# Patient Record
Sex: MALE | Race: WHITE | Employment: OTHER | URBAN - METROPOLITAN AREA
[De-identification: names, ages, dates, MRNs, and addresses within clinical notes are randomized per-mention and may not be internally consistent; named-entity substitution may affect disease eponyms.]

---

## 2017-05-01 ENCOUNTER — OFFICE VISIT (OUTPATIENT)
Dept: INTERNAL MEDICINE CLINIC | Age: 77
End: 2017-05-01

## 2017-05-01 VITALS
HEIGHT: 73 IN | HEART RATE: 75 BPM | BODY MASS INDEX: 34.3 KG/M2 | WEIGHT: 258.8 LBS | SYSTOLIC BLOOD PRESSURE: 157 MMHG | DIASTOLIC BLOOD PRESSURE: 75 MMHG | OXYGEN SATURATION: 97 % | TEMPERATURE: 98 F

## 2017-05-01 DIAGNOSIS — Z13.31 SCREENING FOR DEPRESSION: ICD-10-CM

## 2017-05-01 DIAGNOSIS — Z00.00 ROUTINE GENERAL MEDICAL EXAMINATION AT A HEALTH CARE FACILITY: ICD-10-CM

## 2017-05-01 RX ORDER — IBUPROFEN 200 MG
400 TABLET ORAL AS NEEDED
COMMUNITY
End: 2022-09-25 | Stop reason: ALTCHOICE

## 2017-05-01 NOTE — PROGRESS NOTES
Dr. Puja Dsouza referred Avinash Hardin, 1940, a 68 y.o. male for a Medicare Annual Wellness Visit (AWV). This is a Subsequent Medicare Annual Wellness Visit providing Personalized Prevention Plan Services (PPPS) (Performed 12 months after initial AWV and PPPS )    I have reviewed the patient's medical history in detail and updated the computerized patient record. History     Past Medical History:   Diagnosis Date    Cancer [facility]) 2004    prostate    Hypertension       Past Surgical History:   Procedure Laterality Date    HX CATARACT REMOVAL      bilateral    HX COLONOSCOPY  10/29/2012    Repeat in 10 years    HX CYST REMOVAL      Hands and Pilonidal cyst removal (8 from spine)    HX PROSTATECTOMY  2003     Current Outpatient Prescriptions   Medication Sig Dispense Refill    ibuprofen (MOTRIN) 200 mg tablet Take 400 mg by mouth as needed for Pain.       losartan (COZAAR) 100 mg tablet TAKE 1 TABLET BY MOUTH EVERY DAY 90 Tab 3     No Known Allergies  Family History   Problem Relation Age of Onset    High Cholesterol Mother     Stroke Father     Alcohol abuse Father     Other Father      tobacco abuse    No Known Problems Sister     Cancer Brother      Skin CA    Other Son      Herpes in the eyes (while in [employer])    Cancer Daughter 29     Ovarian CA     Social History   Substance Use Topics    Smoking status: Never Smoker    Smokeless tobacco: Never Used      Comment: 3 months at 12years old/cigar use (occasionally 22 yrs old)    Alcohol use 2.4 oz/week     4 Glasses of wine per week     Patient Active Problem List   Diagnosis Code    Essential hypertension, benign I10    Obesity E66.9    Prostate cancer ([facility] [address] C01    Umbilical hernia R42.6    Prediabetes R73.03    Advanced care planning/counseling discussion Z71.89       Depression Risk Factor Screening:     PHQ 2 / 9, over the last two weeks 5/1/2017   Little interest or pleasure in doing things Not at all   Feeling down, depressed or hopeless Not at all   Total Score PHQ 2 0     Alcohol Risk Factor Screening: On any occasion during the past 3 months, have you had more than 4 drinks containing alcohol? No    Do you average more than 14 drinks per week? No      Functional Ability and Level of Safety:     Hearing Loss   States that there are some issues but not interested in evaluation at this time. Activities of Daily Living   Self-care. Requires assistance with: see table  ADL Assessment 5/1/2017   Feeding yourself No Help Needed   Getting from bed to chair No Help Needed   Getting dressed No Help Needed   Bathing or showering No Help Needed   Walk across the room (includes cane/walker) No Help Needed   Using the telphone No Help Needed   Taking your medications No Help Needed   Preparing meals No Help Needed   Managing money (expenses/bills) No Help Needed   Moderately strenuous housework (laundry) No Help Needed   Shopping for personal items (toiletries/medicines) No Help Needed   Shopping for groceries No Help Needed   Driving No Help Needed   Climbing a flight of stairs No Help Needed   Getting to places beyond walking distances No Help Needed       Fall Risk     Fall Risk Assessment, last 12 mths 5/1/2017   Able to walk? Yes   Fall in past 12 months? No     Abuse Screen   Patient is not abused  Abuse Screening Questionnaire 5/1/2017   Do you ever feel afraid of your partner? N   Are you in a relationship with someone who physically or mentally threatens you? N   Is it safe for you to go home? Y     Review of Systems   Not required    Physical Examination     Evaluation of Cognitive Function:  Mood/affect:  happy  Appearance: age appropriate, casually dressed and overweight  Family member/caregiver input: States that he has noticed changes in his memory. Noticing changes in how he can manipulate numbers, use to be able to mentally calculate. Continue to monitor patient for his memory. No issues noted during visit.     No exam performed today, Medicare Annual Wellness Visit. Patient Care Team:  Kat Christiansen MD as PCP - Jaqueline Momin RN as Nurse Aamir Jones MD (General Surgery)  Chrissy Keating MD (Ophthalmology)    Advice/Referrals/Counseling   Education and counseling provided:  Pneumococcal Vaccine  Influenza Vaccine  Shingles Vaccine      Assessment/Plan       ICD-10-CM ICD-9-CM    1. Routine general medical examination at a health care facility Z00.00 V70.0 [address]   2. Screening for depression Z13.89 V79.0 DEPRESSION SCREEN ANNUAL   3. Reviewed medications and side effects in detail. A.  Med Rec completed during today's visit. Medications Discontinued During This Encounter   Medication Reason    losartan (COZAAR) 461 mg tablet Duplicate Order    tadalafil (CIALIS) 5 mg tablet Not A Current Medication    HYDROcodone-acetaminophen (NORCO) 5-325 mg per tablet Therapy Completed     4. Immunizations:   A.  PCV13:  Not currently interested. B. Influenza:  Not currently interested. C.  Shingles:  Not currently interested. 5.  Preventive Screenings:  Currently up to date. 6.  Labs:     A.  A1C:  Consider checking at next PCP appointment. 7.  Follow up:  Due for PCP follow up. Patient to schedule prior to leaving today. 8.  Exercise:  [place], does gardening and will split wood. He is very active throughout the week. 9.  Advanced Directives:  States that he has his  completing the information. Requested for him to bring a copy to next visit, once completed. Patient verbalized understanding of information presented. Answered all of the patient's questions. AVS handed and reviewed with patient.     Rohith Christina, ROSANGELAD, BCACP

## 2017-05-01 NOTE — PATIENT INSTRUCTIONS
Medicare Part B Preventive Services Limitations Recommendation Scheduled   Bone Mass Measurement  (age 72 & older, biennial) Requires diagnosis related to osteoporosis or estrogen deficiency. Biennial benefit unless patient has history of long-term glucocorticoid tx or baseline is needed because initial test was by other method N/A N/A   Cardiovascular Screening Blood Tests (every 5 years)  Total cholesterol, HDL, Triglycerides Order as a panel if possible Last:  10/28/16    Every Year Next: 10/2017   Colorectal Cancer Screening  -Fecal occult blood test (annual)  -Flexible sigmoidoscopy (5y)  -Screening colonoscopy (10y)  -Barium Enema  Last: 10/29/12    Repeat in 10 years Next: 10/2022   Counseling to Prevent Tobacco Use (up to 8 sessions per year)  - Counseling greater than 3 and up to 10 minutes  - Counseling greater than 10 minutes Patients must be asymptomatic of tobacco-related conditions to receive as preventive service N/A N/A   Diabetes Screening Tests (at least every 3 years, Medicare covers annually or at 6-month intervals for prediabetic patients)    Fasting blood sugar (FBS) or glucose tolerance test (GTT) Patient must be diagnosed with one of the following:  -Hypertension, Dyslipidemia, obesity, previous impaired FBS or GTT  Or any two of the following: overweight, FH of diabetes, age ? 72, history of gestational diabetes, birth of baby weighing more than 9 pounds Last: 10/28/16, last A1C 5.8%    Every 6-12 months depending on your result   Next:  4/2017-10/2017   Diabetes Self-Management Training (DSMT) (no USPSTF recommendation) Requires referral by treating physician for patient with diabetes or renal disease. 10 hours of initial DSMT session of no less than 30 minutes each in a continuous 12-month period. 2 hours of follow-up DSMT in subsequent years.  N/A N/A   Glaucoma Screening (no USPSTF recommendation) Diabetes mellitus, family history, , age 48 or over,  American, age 72 or over Last: 12/2016    Every year Next:  12/2017   Human Immunodeficiency Virus (HIV) Screening (annually for increased risk patients)  HIV-1 and HIV-2 by EIA, ANDREW, rapid antibody test, or oral mucosa transudate Patient must be at increased risk for HIV infection per USPSTF guidelines or pregnant. Tests covered annually for patients at increased risk. Pregnant patients may receive up to 3 test during pregnancy. N/A N/A   Medical Nutrition Therapy (MNT) (for diabetes or renal disease not recommended schedule) Requires referral by treating physician for patient with diabetes or renal disease. Can be provided in same year as diabetes self-management training (DSMT), and CMS recommends medical nutrition therapy take place after DSMT. Up to 3 hours for initial year and 2 hours in subsequent years. N/A N/A   Prostate Cancer Screening (annually up to age 76)  - Digital rectal exam (ODETTE)  - Prostate specific antigen (PSA) Annually (age 48 or over), ODETTE not paid separately when covered E/M service is provided on same date Last:  Checked by Dr. Puja Dsouza Next:  Discuss with your doctor if this test is appropriate for you   Seasonal Influenza Vaccination (annually)  Last:     Every Fall Please get one this Fall   Shingles Vaccination A shingles vaccine is also recommended once in a lifetime after age 61  Last Next:  Consider getting at your local pharmacy   Pneumococcal Vaccination (once after 72)  Last: Pneumovax: 10/30/2013 Next: Jose Mckeon 13 due, consider getting at your local pharmacy   Hepatitis B Vaccinations (if medium/high risk) Medium/high risk factors:  End-stage renal disease,  Hemophiliacs who received Factor VIII or IX concentrates, Clients of institutions for the mentally retarded, Persons who live in the same house as a HepB virus carrier, Homosexual men, Illicit injectable drug abusers.  N/A N/A   Ultrasound Screening for Abdominal Aortic Aneurysm (AAA) (once) Patient must be referred through [facility] and not have had a screening for abdominal aortic aneurysm before under Medicare.   Limited to patients who meet one of the following criteria:  - Men who are 73-68 years old and have smoked more than 100 cigarettes in their lifetime.  -Anyone with a FH of AAA  -Anyone recommended for screening by USPSTF N/A N/A   N/A:  Not applicable

## 2017-05-01 NOTE — MR AVS SNAPSHOT
Visit Information Date & Time Provider Department Dept. Phone Encounter #  
 5/1/2017  2:00 PM Prasanth Saldaña, [address] [phone] [id number] Follow-up Instructions Return Needs PCP F/u, for F/u with Dr. Puja Dsouza. Upcoming Health Maintenance Date Due ZOSTER VACCINE AGE 60> 10/16/2000 GLAUCOMA SCREENING Q2Y 10/16/2005 Pneumococcal 65+ High/Highest Risk (2 of 2 - PCV13) 10/30/2014 MEDICARE YEARLY EXAM 11/4/2016 INFLUENZA AGE 9 TO ADULT 8/1/2017 COLONOSCOPY 10/29/2022 DTaP/Tdap/Td series (2 - Td) 10/30/2023 Allergies as of 5/1/2017  Review Complete On: 5/1/2017 By: Prasanth Saldaña PHARMD  
 No Known Allergies Current Immunizations  Reviewed on 5/1/2017 Name Date Pneumococcal Polysaccharide (PPSV-23) 10/30/2013 Tdap 10/30/2013 Reviewed by Prasanth Saldaña PHARMD on 5/1/2017 at  2:42 PM  
You Were Diagnosed With   
  
 Codes Comments Routine general medical examination at a health care facility     ICD-10-CM: Z00.00 ICD-9-CM: V70.0 Screening for depression     ICD-10-CM: Z13.89 ICD-9-CM: V79.0 Vitals BP Pulse Temp Height(growth percentile) Weight(growth percentile) SpO2  
 157/75 (BP 1 Location: Right arm, BP Patient Position: Sitting) 75 98 °F (36.7 °C) (Oral) 6' 1\" (1.854 m) 258 lb 12.8 oz (117.4 kg) 97% BMI Smoking Status 34.14 kg/m2 Never Smoker Vitals History BMI and BSA Data Body Mass Index Body Surface Area  
 34.14 kg/m 2 2.46 m 2 Preferred Pharmacy Pharmacy Name Phone [id number] [place] [address] [phone] Your Updated Medication List  
  
   
This list is accurate as of: 5/1/17  2:51 PM.  Always use your most recent med list.  
  
  
  
  
 ibuprofen 200 mg tablet Commonly known as:  MOTRIN Take 400 mg by mouth as needed for Pain.  
  
 losartan 100 mg tablet Commonly known as:  COZAAR  
TAKE 1 TABLET BY MOUTH EVERY DAY We Performed the Following [address] [[id number] [facility]] Follow-up Instructions Return Needs PCP F/u, for F/u with Dr. Swetha Bauman. Patient Instructions Medicare Part B Preventive Services Limitations Recommendation Scheduled Bone Mass Measurement 
(age 72 & older, biennial) Requires diagnosis related to osteoporosis or estrogen deficiency. Biennial benefit unless patient has history of long-term glucocorticoid tx or baseline is needed because initial test was by other method N/A N/A Cardiovascular Screening Blood Tests (every 5 years) Total cholesterol, HDL, Triglycerides Order as a panel if possible Last:  10/28/16 Every Year Next: 10/2017 Colorectal Cancer Screening 
-Fecal occult blood test (annual) -Flexible sigmoidoscopy (5y) 
-Screening colonoscopy (10y) -Barium Enema  Last: 10/29/12 Repeat in 10 years Next: 10/2022 Counseling to Prevent Tobacco Use (up to 8 sessions per year) - Counseling greater than 3 and up to 10 minutes - Counseling greater than 10 minutes Patients must be asymptomatic of tobacco-related conditions to receive as preventive service N/A N/A Diabetes Screening Tests (at least every 3 years, Medicare covers annually or at 6-month intervals for prediabetic patients) Fasting blood sugar (FBS) or glucose tolerance test (GTT) Patient must be diagnosed with one of the following: 
-Hypertension, Dyslipidemia, obesity, previous impaired FBS or GTT 
Or any two of the following: overweight, FH of diabetes, age ? 72, history of gestational diabetes, birth of baby weighing more than 9 pounds Last: 10/28/16, last A1C 5.8% Every 6-12 months depending on your result Next:  4/2017-10/2017 Diabetes Self-Management Training (DSMT) (no USPSTF recommendation) Requires referral by treating physician for patient with diabetes or renal disease. 10 hours of initial DSMT session of no less than 30 minutes each in a continuous 12-month period. 2 hours of follow-up DSMT in subsequent years. N/A N/A Glaucoma Screening (no USPSTF recommendation) Diabetes mellitus, family history, , age 48 or over,  American, age 72 or over Last: 12/2016 Every year Next:  12/2017 Human Immunodeficiency Virus (HIV) Screening (annually for increased risk patients) HIV-1 and HIV-2 by EIA, ANDREW, rapid antibody test, or oral mucosa transudate Patient must be at increased risk for HIV infection per USPSTF guidelines or pregnant. Tests covered annually for patients at increased risk. Pregnant patients may receive up to 3 test during pregnancy. N/A N/A Medical Nutrition Therapy (MNT) (for diabetes or renal disease not recommended schedule) Requires referral by treating physician for patient with diabetes or renal disease. Can be provided in same year as diabetes self-management training (DSMT), and CMS recommends medical nutrition therapy take place after DSMT. Up to 3 hours for initial year and 2 hours in subsequent years. N/A N/A Prostate Cancer Screening (annually up to age 76) - Digital rectal exam (ODETTE) - Prostate specific antigen (PSA) Annually (age 48 or over), ODETTE not paid separately when covered E/M service is provided on same date Last:  Checked by Dr. Anahi Hinson Next:  Discuss with your doctor if this test is appropriate for you Seasonal Influenza Vaccination (annually)  Last:  
 
Every Fall Please get one this Fall Shingles Vaccination A shingles vaccine is also recommended once in a lifetime after age 62  Last Next:  Consider getting at your local pharmacy Pneumococcal Vaccination (once after 65)  Last: Pneumovax: 10/30/2013 Next: prevnar 13 due, consider getting at your local pharmacy Hepatitis B Vaccinations (if medium/high risk) Medium/high risk factors:  End-stage renal disease, 
 Hemophiliacs who received Factor VIII or IX concentrates, Clients of institutions for the mentally retarded, Persons who live in the same house as a HepB virus carrier, Homosexual men, Illicit injectable drug abusers. N/A N/A Ultrasound Screening for Abdominal Aortic Aneurysm (AAA) (once) Patient must be referred through [facility] and not have had a screening for abdominal aortic aneurysm before under Medicare. Limited to patients who meet one of the following criteria: 
- Men who are 73-68 years old and have smoked more than 100 cigarettes in their lifetime. 
-Anyone with a FH of AAA 
-Anyone recommended for screening by USPSTF N/A N/A  
N/A:  Not applicable Introducing [facility]! Laure Davis introduces [facility] patient portal. Now you can access parts of your medical record, email your doctor's office, and request medication refills online. 1. In your internet browser, go to [email or web address] 2. Click on the First Time User? Click Here link in the Sign In box. You will see the New Member Sign Up page. 3. Enter your [facility] Access Code exactly as it appears below. You will not need to use this code after youve completed the sign-up process. If you do not sign up before the expiration date, you must request a new code. · [facility] Access Code: [email or web address] Expires: 7/30/2017  2:51 PM 
 
4. Enter the last four digits of your Social Security Number (xxxx) and Date of Birth (mm/dd/yyyy) as indicated and click Submit. You will be taken to the next sign-up page. 5. Create a tsumobit ID. This will be your [facility] login ID and cannot be changed, so think of one that is secure and easy to remember. 6. Create a [facility] password. You can change your password at any time. 7. Enter your Password Reset Question and Answer. This can be used at a later time if you forget your password. 8. Enter your e-mail address.  You will receive e-mail notification when new information is available in Ketsu. 9. Click Sign Up. You can now view and download portions of your medical record. 10. Click the Download Summary menu link to download a portable copy of your medical information. If you have questions, please visit the Frequently Asked Questions section of the K[employer]u website. Remember, [employer] is NOT to be used for urgent needs. For medical emergencies, dial 911. Now available from your iPhone and Android! Please provide this summary of care documentation to your next provider. Your primary care clinician is listed as Shayla SANCHEZ. If you have any questions after today's visit, please call [phone].

## 2017-05-18 ENCOUNTER — OFFICE VISIT (OUTPATIENT)
Dept: INTERNAL MEDICINE CLINIC | Age: 77
End: 2017-05-18

## 2017-05-18 VITALS
TEMPERATURE: 98 F | HEART RATE: 76 BPM | BODY MASS INDEX: 33.43 KG/M2 | OXYGEN SATURATION: 98 % | RESPIRATION RATE: 16 BRPM | SYSTOLIC BLOOD PRESSURE: 126 MMHG | HEIGHT: 73 IN | DIASTOLIC BLOOD PRESSURE: 72 MMHG | WEIGHT: 252.2 LBS

## 2017-05-18 DIAGNOSIS — R10.10 UPPER ABDOMINAL PAIN: ICD-10-CM

## 2017-05-18 DIAGNOSIS — K42.9 UMBILICAL HERNIA WITHOUT MENTION OF OBSTRUCTION OR GANGRENE: Primary | ICD-10-CM

## 2017-05-18 DIAGNOSIS — M62.08 RECTUS DIASTASIS: ICD-10-CM

## 2017-05-18 DIAGNOSIS — K42.9 UMBILICAL HERNIA WITHOUT OBSTRUCTION AND WITHOUT GANGRENE: ICD-10-CM

## 2017-05-18 DIAGNOSIS — R10.10 UPPER ABDOMINAL PAIN: Primary | ICD-10-CM

## 2017-05-18 NOTE — MR AVS SNAPSHOT
Visit Information Date & Time Provider Department Dept. Phone Encounter #  
 5/18/2017  3:15 PM Memojudith Peralta, [address] [place] [address],[address] [phone] [id number] Follow-up Instructions Return in about 2 weeks (around 6/1/2017) for abdominal pain. Upcoming Health Maintenance Date Due ZOSTER VACCINE AGE 60> 10/16/2000 GLAUCOMA SCREENING Q2Y 10/16/2005 Pneumococcal 65+ High/Highest Risk (2 of 2 - PCV13) 10/30/2014 INFLUENZA AGE 9 TO ADULT 8/1/2017 MEDICARE YEARLY EXAM 5/2/2018 COLONOSCOPY 10/29/2022 DTaP/Tdap/Td series (2 - Td) 10/30/2023 Allergies as of 5/18/2017  Review Complete On: 5/18/2017 By: Yury Charles No Known Allergies Current Immunizations  Reviewed on 5/1/2017 Name Date Pneumococcal Polysaccharide (PPSV-23) 10/30/2013 Tdap 10/30/2013 Not reviewed this visit You Were Diagnosed With   
  
 Codes Comments Upper abdominal pain    -  Primary ICD-10-CM: R10.10 ICD-9-CM: 789.09 Umbilical hernia without obstruction and without gangrene     ICD-10-CM: K42.9 ICD-9-CM: 553.1 Rectus diastasis     ICD-10-CM: M62.08 
ICD-9-CM: 728.84 Vitals BP Pulse Temp Resp Height(growth percentile) Weight(growth percentile) 126/72 (BP 1 Location: Left arm, BP Patient Position: Sitting) 76 98 °F (36.7 °C) (Oral) 16 6' 1\" (1.854 m) 252 lb 3.2 oz (114.4 kg) SpO2 BMI Smoking Status 98% 33.27 kg/m2 Never Smoker Vitals History BMI and BSA Data Body Mass Index Body Surface Area  
 33.27 kg/m 2 2.43 m 2 Preferred Pharmacy Pharmacy Name Phone [id number] [place] , [address] [address] [phone] Your Updated Medication List  
  
   
This list is accurate as of: 5/18/17  3:55 PM.  Always use your most recent med list.  
  
  
  
  
 ibuprofen 200 mg tablet Commonly known as:  MOTRIN Take 400 mg by mouth as needed for Pain. losartan 100 mg tablet Commonly known as:  COZAAR  
TAKE 1 TABLET BY MOUTH EVERY DAY We Performed the Following CBC W/O DIFF [38474 CPT(R)] METABOLIC PANEL, COMPREHENSIVE [01509 CPT(R)] URINALYSIS W/ RFLX MICROSCOPIC [91394 CPT(R)] Follow-up Instructions Return in about 2 weeks (around 6/1/2017) for abdominal pain. To-Do List   
 05/18/2017 Imaging:  XR ABD FLAT/ ERECT   
  
 05/19/2017 Imaging:  CT ABD W CONT Introducing [facility]! Miguel Acevedo introduces [facility] patient portal. Now you can access parts of your medical record, email your doctor's office, and request medication refills online. 1. In your internet browser, go to [email or web address] 2. Click on the First Time User? Click Here link in the Sign In box. You will see the New Member Sign Up page. 3. Enter your [facility] Access Code exactly as it appears below. You will not need to use this code after youve completed the sign-up process. If you do not sign up before the expiration date, you must request a new code. · [facility] Access Code: [email or web address] Expires: 7/30/2017  2:51 PM 
 
4. Enter the last four digits of your Social Security Number (xxxx) and Date of Birth (mm/dd/yyyy) as indicated and click Submit. You will be taken to the next sign-up page. 5. Create a [facility] ID. This will be your [facility] login ID and cannot be changed, so think of one that is secure and easy to remember. 6. Create a [facility] password. You can change your password at any time. 7. Enter your Password Reset Question and Answer. This can be used at a later time if you forget your password. 8. Enter your e-mail address. You will receive e-mail notification when new information is available in [address] 9. Click Sign Up. You can now view and download portions of your medical record. 10. Click the Download Summary menu link to download a portable copy of your medical information. If you have questions, please visit the Frequently Asked Questions section of the [employer] website. Remember, [employer] is NOT to be used for urgent needs. For medical emergencies, dial 911. Now available from your iPhone and Android! Please provide this summary of care documentation to your next provider. Your primary care clinician is listed as Haritha SANCHEZ. If you have any questions after today's visit, please call [phone].

## 2017-05-18 NOTE — PROGRESS NOTES
HISTORY OF PRESENT ILLNESS  Davis Rowe is a 68 y.o. male. HPI   Pt c/o feeling bad with pain in upper abdomen x 1 week  Sometimes radiation to back lumbar area  Pain is dull, worse when sitting and when trying to lay supine at night  No n/v or post meal pain  Normal bm's  Urine has been dark. Stools normal appearing  Weight unchanged  He feels that his rectus diastasis has become more prominent and painful      Patient Active Problem List    Diagnosis Date Noted    Advanced care planning/counseling discussion 11/04/2015    Prediabetes 10/30/2013    Essential hypertension, benign 04/29/2010    Obesity 04/29/2010    [facility] 48/73/5511    Umbilical hernia 70/99/9677     Current Outpatient Prescriptions   Medication Sig Dispense Refill    ibuprofen (MOTRIN) 200 mg tablet Take 400 mg by mouth as needed for Pain.  losartan (COZAAR) 100 mg tablet TAKE 1 TABLET BY MOUTH EVERY DAY 90 Tab 3     No Known Allergies   Lab Results  Component Value Date/Time   WBC 5.4 10/28/2016 08:54 AM   WBC 6.0 05/10/2012 07:53 AM   HGB 15.0 10/28/2016 08:54 AM   HCT 44.4 10/28/2016 08:54 AM   PLATELET 373 89/57/4692 08:54 AM   MCV 89 10/28/2016 08:54 AM       Lab Results  Component Value Date/Time   GFR est AA 94 10/28/2016 08:54 AM   GFR est non-AA 82 10/28/2016 08:54 AM   Creatinine 0.91 10/28/2016 08:54 AM   BUN 14 10/28/2016 08:54 AM   Sodium 143 10/28/2016 08:54 AM   Potassium 4.9 10/28/2016 08:54 AM   Chloride 103 10/28/2016 08:54 AM   CO2 23 10/28/2016 08:54 AM         ROS    Physical Exam   Constitutional: He appears well-developed and well-nourished. No distress. Appears stated age   HENT:   Head: Normocephalic. Cardiovascular: Normal rate, regular rhythm and normal heart sounds. Pulmonary/Chest: Effort normal and breath sounds normal.   Abdominal: Soft. Mild TTP upper abdomen, visible rectus diastasis with valsalva. Umbilical hernia is soft and not TTP   Musculoskeletal: He exhibits no edema. Neurological: He is alert. Psychiatric: He has a normal mood and affect. Nursing note and vitals reviewed. ASSESSMENT and PLAN  Patrick Montelongo was seen today for hernia (non specific) and possible hernia. Diagnoses and all orders for this visit:    Upper abdominal pain  -     CT ABD W CONT; Future  -     CBC W/O DIFF  -     METABOLIC PANEL, COMPREHENSIVE  -     URINALYSIS W/ RFLX MICROSCOPIC  -     XR ABD FLAT/ ERECT; Future    Umbilical hernia without obstruction and without gangrene  -     XR ABD FLAT/ ERECT; Future    Rectus diastasis      Follow-up Disposition:  Return in about 2 weeks (around 6/1/2017) for abdominal pain.

## 2017-05-19 ENCOUNTER — HOSPITAL ENCOUNTER (OUTPATIENT)
Dept: CT IMAGING | Age: 77
Discharge: HOME | End: 2017-05-19
Attending: INTERNAL MEDICINE
Payer: MEDICARE

## 2017-05-19 DIAGNOSIS — R10.10 UPPER ABDOMINAL PAIN: ICD-10-CM

## 2017-05-19 LAB
ALBUMIN SERPL-MCNC: 4.2 G/DL (ref 3.5–4.8)
ALBUMIN/GLOB SERPL: 1.5 {RATIO} (ref 1.2–2.2)
ALP SERPL-CCNC: 65 IU/L (ref 39–117)
ALT SERPL-CCNC: 13 IU/L (ref 0–44)
APPEARANCE UR: CLEAR
AST SERPL-CCNC: 17 IU/L (ref 0–40)
BILIRUB SERPL-MCNC: 0.5 MG/DL (ref 0–1.2)
BILIRUB UR QL STRIP: NEGATIVE
BUN SERPL-MCNC: 19 MG/DL (ref 8–27)
BUN/CREAT SERPL: 19 (ref 10–24)
CALCIUM SERPL-MCNC: 8.9 MG/DL (ref 8.6–10.2)
CHLORIDE SERPL-SCNC: 101 MMOL/L (ref 96–106)
CO2 SERPL-SCNC: 25 MMOL/L (ref 18–29)
COLOR UR: YELLOW
CREAT SERPL-MCNC: 1 MG/DL (ref 0.76–1.27)
ERYTHROCYTE [DISTWIDTH] IN BLOOD BY AUTOMATED COUNT: 14.7 % (ref 12.3–15.4)
GLOBULIN SER CALC-MCNC: 2.8 G/DL (ref 1.5–4.5)
GLUCOSE SERPL-MCNC: 89 MG/DL (ref 65–99)
GLUCOSE UR QL: NEGATIVE
HCT VFR BLD AUTO: 41.6 % (ref 37.5–51)
HGB BLD-MCNC: 14.5 G/DL (ref 12.6–17.7)
HGB UR QL STRIP: NEGATIVE
KETONES UR QL STRIP: NEGATIVE
LEUKOCYTE ESTERASE UR QL STRIP: NEGATIVE
MCH RBC QN AUTO: 30.2 PG (ref 26.6–33)
MCHC RBC AUTO-ENTMCNC: 34.9 G/DL (ref 31.5–35.7)
MCV RBC AUTO: 87 FL (ref 79–97)
MICRO URNS: ABNORMAL
NITRITE UR QL STRIP: NEGATIVE
PH UR STRIP: 6 [PH] (ref 5–7.5)
PLATELET # BLD AUTO: 201 X10E3/UL (ref 150–379)
POTASSIUM SERPL-SCNC: 4.7 MMOL/L (ref 3.5–5.2)
PROT SERPL-MCNC: 7 G/DL (ref 6–8.5)
PROT UR QL STRIP: NEGATIVE
RBC # BLD AUTO: 4.8 X10E6/UL (ref 4.14–5.8)
SODIUM SERPL-SCNC: 140 MMOL/L (ref 134–144)
SP GR UR: 1.03 (ref 1–1.03)
UROBILINOGEN UR STRIP-MCNC: 2 MG/DL (ref 0.2–1)
WBC # BLD AUTO: 7.4 X10E3/UL (ref 3.4–10.8)

## 2017-05-19 PROCEDURE — 74011000255 HC RX REV CODE- 255: Performed by: INTERNAL MEDICINE

## 2017-05-19 PROCEDURE — 74011636320 HC RX REV CODE- 636/320: Performed by: INTERNAL MEDICINE

## 2017-05-19 PROCEDURE — 74160 CT ABDOMEN W/CONTRAST: CPT

## 2017-05-19 PROCEDURE — 74011250636 HC RX REV CODE- 250/636: Performed by: INTERNAL MEDICINE

## 2017-05-19 RX ORDER — SODIUM CHLORIDE 0.9 % (FLUSH) 0.9 %
10 SYRINGE (ML) INJECTION
Status: COMPLETED | OUTPATIENT
Start: 2017-05-19 | End: 2017-05-19

## 2017-05-19 RX ORDER — SODIUM CHLORIDE 9 MG/ML
50 INJECTION, SOLUTION INTRAVENOUS
Status: COMPLETED | OUTPATIENT
Start: 2017-05-19 | End: 2017-05-19

## 2017-05-19 RX ORDER — BARIUM SULFATE 20 MG/ML
900 SUSPENSION ORAL
Status: COMPLETED | OUTPATIENT
Start: 2017-05-19 | End: 2017-05-19

## 2017-05-19 RX ADMIN — IOPAMIDOL 100 ML: 755 INJECTION, SOLUTION INTRAVENOUS at 10:53

## 2017-05-19 RX ADMIN — SODIUM CHLORIDE 50 ML/HR: 900 INJECTION, SOLUTION INTRAVENOUS at 10:53

## 2017-05-19 RX ADMIN — Medication 10 ML: at 10:53

## 2017-05-19 RX ADMIN — BARIUM SULFATE 900 ML: 21 SUSPENSION ORAL at 10:53

## 2017-05-19 NOTE — PROGRESS NOTES
Discussed results of labs , xr, CT -see general surgeon -Dr Cindy Stein for the hernia and upper abd pain.

## 2017-05-22 ENCOUNTER — OFFICE VISIT (OUTPATIENT)
Dept: SURGERY | Age: 77
End: 2017-05-22

## 2017-05-22 VITALS
OXYGEN SATURATION: 96 % | SYSTOLIC BLOOD PRESSURE: 158 MMHG | HEIGHT: 73 IN | DIASTOLIC BLOOD PRESSURE: 82 MMHG | HEART RATE: 64 BPM | WEIGHT: 252.6 LBS | BODY MASS INDEX: 33.48 KG/M2

## 2017-05-22 DIAGNOSIS — Z01.818 PREOP TESTING: ICD-10-CM

## 2017-05-22 DIAGNOSIS — K42.9 UMBILICAL HERNIA WITHOUT OBSTRUCTION AND WITHOUT GANGRENE: Primary | ICD-10-CM

## 2017-05-22 NOTE — MR AVS SNAPSHOT
Visit Information Date & Time Provider Department Dept. Phone Encounter #  
 5/22/2017  2:20 PM Stacia Hamilton MD [facility] [place] [id number] Upcoming Health Maintenance Date Due ZOSTER VACCINE AGE 60> 10/16/2000 GLAUCOMA SCREENING Q2Y 10/16/2005 Pneumococcal 65+ High/Highest Risk (2 of 2 - PCV13) 10/30/2014 INFLUENZA AGE 9 TO ADULT 8/1/2017 MEDICARE YEARLY EXAM 5/2/2018 COLONOSCOPY 10/29/2022 DTaP/Tdap/Td series (2 - Td) 10/30/2023 Allergies as of 5/22/2017  Review Complete On: 5/22/2017 By: Stacia Hamilton MD  
 No Known Allergies Current Immunizations  Reviewed on 5/1/2017 Name Date Pneumococcal Polysaccharide (PPSV-23) 10/30/2013 Tdap 10/30/2013 Not reviewed this visit You Were Diagnosed With   
  
 Codes Comments Umbilical hernia without obstruction and without gangrene    -  Primary ICD-10-CM: K42.9 ICD-9-CM: 553.1 Preop testing     ICD-10-CM: B67.368 ICD-9-CM: V72.84 Vitals BP Pulse Height(growth percentile) Weight(growth percentile) SpO2 BMI  
 158/82 (BP 1 Location: Right arm, BP Patient Position: Sitting) 64 6' 1\" (1.854 m) 252 lb 9.6 oz (114.6 kg) 96% 33.33 kg/m2 Smoking Status Never Smoker BMI and BSA Data Body Mass Index Body Surface Area  
 33.33 kg/m 2 2.43 m 2 Preferred Pharmacy Pharmacy Name Phone [id number] [place] [address] [phone] Your Updated Medication List  
  
   
This list is accurate as of: 5/22/17  5:00 PM.  Always use your most recent med list.  
  
  
  
  
 ibuprofen 200 mg tablet Commonly known as:  MOTRIN Take 400 mg by mouth as needed for Pain.  
  
 losartan 100 mg tablet Commonly known as:  COZAAR  
TAKE 1 TABLET BY MOUTH EVERY DAY To-Do List   
 05/22/2017 ECG:  EKG, 12 LEAD, INITIAL Introducing [facility]! Annmarie Clark introduces [employer]am[employer] patient portal. Now you can access parts of your medical record, email your doctor's office, and request medication refills online. 1. In your internet browser, go to [email or web address] 2. Click on the First Time User? Click Here link in the Sign In box. You will see the New Member Sign Up page. 3. Enter your Famely Access Code exactly as it appears below. You will not need to use this code after youve completed the sign-up process. If you do not sign up before the expiration date, you must request a new code. · Famely Access Code: [email or web address] Expires: 7/30/2017  2:51 PM 
 
4. Enter the last four digits of your Social Security Number (xxxx) and Date of Birth (mm/dd/yyyy) as indicated and click Submit. You will be taken to the next sign-up page. 5. Create a Famely ID. This will be your Famely login ID and cannot be changed, so think of one that is secure and easy to remember. 6. Create a Famely password. You can change your password at any time. 7. Enter your Password Reset Question and Answer. This can be used at a later time if you forget your password. 8. Enter your e-mail address. You will receive e-mail notification when new information is available in [address] 9. Click Sign Up. You can now view and download portions of your medical record. 10. Click the Download Summary menu link to download a portable copy of your medical information. If you have questions, please visit the Frequently Asked Questions section of the Famely website. Remember, [employer]ely is NOT to be used for urgent needs. For medical emergencies, dial 911. Now available from your iPhone and Android! Please provide this summary of care documentation to your next provider. Your primary care clinician is listed as Roman SANCHEZ. If you have any questions after today's visit, please call [phone].

## 2017-05-22 NOTE — PROGRESS NOTES
Surgery Consult:  Umbilical hernia  Requesting physician:  Dr. Jenae Dumont    Subjective:   Patient 68 y.o.  male presents with umbilical bulge for 25 years. It hasn't been bothering him until 2 weeks ago when he developed severe sharp periumbilical pain radiating to the back. No obvious palliative factors. Pain worse when lying down. Patient denies any recent trauma. No obstructive symptoms. No nausea or vomiting. Patient with history chronic constipation. Took some miralax last week and is having diarrhea today. Prior abdominal surgeries include open prostatectomy. No F/C/S. No dysuria. Past Medical & Surgical History:  Past Medical History:   Diagnosis Date    Cancer [facility]) 2004    prostate    Hypertension       Past Surgical History:   Procedure Laterality Date    HX CATARACT REMOVAL      bilateral    HX COLONOSCOPY  10/29/2012    Repeat in 10 years    HX CYST REMOVAL      Hands and Pilonidal cyst removal (8 from spine)    HX PROSTATECTOMY  2003       Social History:  Social History     Social History    Marital status:      Spouse name: N/A    Number of children: N/A    Years of education: N/A     Occupational History    Not on file.      Social History Main Topics    Smoking status: Never Smoker    Smokeless tobacco: Never Used      Comment: 3 months at 12years old/cigar use (occasionally 22 yrs old)    Alcohol use 2.4 oz/week     4 Glasses of wine per week    Drug use: No    Sexual activity: Yes     Partners: Female     Other Topics Concern    Not on file     Social History Narrative        Family History:  Family History   Problem Relation Age of Onset    High Cholesterol Mother     Stroke Father     Alcohol abuse Father     Other Father      tobacco abuse    No Known Problems Sister     Cancer Brother      Skin CA    Other Son      Herpes in the eyes (while in [employer])    Cancer Daughter 28     Ovarian CA        Medications:  Current Outpatient Prescriptions   Medication Sig    ibuprofen (MOTRIN) 200 mg tablet Take 400 mg by mouth as needed for Pain.  losartan (COZAAR) 100 mg tablet TAKE 1 TABLET BY MOUTH EVERY DAY     No current facility-administered medications for this visit. Allergies:  No Known Allergies    Review of Systems  A comprehensive review of systems was negative except for that written in the HPI. Objective:     Exam:    Visit Vitals    /82 (BP 1 Location: Right arm, BP Patient Position: Sitting)    Pulse 64    Ht 6' 1\" (1.854 m)    Wt 114.6 kg (252 lb 9.6 oz)    SpO2 96%    BMI 33.33 kg/m2     General appearance: alert, cooperative, no distress, appears stated age  Eyes: negative  Lungs: clear to auscultation bilaterally  Heart: regular rate and rhythm  Abdomen: soft, non-distended. Reducible umbilical hernia with 2 cm fascial defect. Skin overlying the hernia is very thin. Extremities: extremities normal, atraumatic, no cyanosis or edema. HUONG. Skin: Skin color, texture, turgor normal. No rashes or lesions. Neurologic: Grossly normal      Assessment:     Umbilical hernia    Plan:     UHR with mesh. Risks, benefit, and alternative to surgery was discussed with the patient. The risks of surgery include but not limited to infection, bleeding, intraabdominal organ injury, recurrence, and the risks of general anesthetic. Patient is agreeable to surgery. All questions answered.

## 2017-05-24 ENCOUNTER — HOSPITAL ENCOUNTER (OUTPATIENT)
Dept: NON INVASIVE DIAGNOSTICS | Age: 77
Discharge: HOME | End: 2017-05-24
Payer: MEDICARE

## 2017-05-24 DIAGNOSIS — K42.9 UMBILICAL HERNIA WITHOUT OBSTRUCTION AND WITHOUT GANGRENE: ICD-10-CM

## 2017-05-24 DIAGNOSIS — Z01.818 PREOP TESTING: ICD-10-CM

## 2017-05-24 LAB
ATRIAL RATE: 61 BPM
CALCULATED P AXIS, ECG09: 17 DEGREES
CALCULATED R AXIS, ECG10: 27 DEGREES
CALCULATED T AXIS, ECG11: 55 DEGREES
DIAGNOSIS, 93000: NORMAL
P-R INTERVAL, ECG05: 184 MS
Q-T INTERVAL, ECG07: 414 MS
QRS DURATION, ECG06: 80 MS
QTC CALCULATION (BEZET), ECG08: 416 MS
VENTRICULAR RATE, ECG03: 61 BPM

## 2017-05-24 PROCEDURE — 93005 ELECTROCARDIOGRAM TRACING: CPT

## 2017-05-24 NOTE — PERIOP NOTES
[facility]  Preoperative Instructions        Surgery Date 06/01/2017        Time of Arrival 0945 am Contact #[phone]    1. On the day of your surgery, please report to the Surgical Services Registration Desk and sign in at your designated time. The Surgery Center is located to the right of the Emergency Room. 2. You must have someone with you to drive you home. You should not drive a car for 24 hours following surgery. Please make arrangements for a friend or family member to stay with you for the first 24 hours after your surgery. 3. Do not have anything to eat or drink (including water, gum, mints, coffee, juice) after midnight 05/30/2017. This may not apply to medications prescribed by your physician. Please note special instructions, if applicable. If you are currently taking Plavix, Coumadin, or other blood-thinning agents, contact your surgeon for instructions. 4. We recommend you do not drink any alcoholic beverages for 24 hours before and after your surgery. 5. Have a list of all current medications, including vitamins, herbal supplements and any other over the counter medications. Stop all Aspirin and non-steroidal anti-inflammatory drugs (I.e. Advil, Aleve), as directed by your surgeon's office. Stop all vitamins and herbal supplements seven days prior to your surgery. 6. Wear comfortable clothes. Wear glasses instead of contacts. Do not bring any money or jewelry. Please bring picture ID, insurance card, and any prearranged co-payment or hospital payment. Do not wear make-up, particularly mascara the morning of your surgery. Do not wear nail polish, particularly if you are having foot /hand surgery. Wear your hair loose or down, no ponytails, buns, brian pins or clips. All body piercings must be removed.   Please shower with antibacterial soap for three consecutive days before and on the morning of surgery, but do not apply any lotions, powders or deodorants after the shower on the day of surgery. Please use a fresh towels after each shower. Please sleep in clean clothes and change bed linens the night before surgery. Please do not shave for 48 hours prior to surgery. Shaving of the face is acceptable. 7. You should understand that if you do not follow these instructions your surgery may be cancelled. If your physical condition changes (I.e. fever, cold or flu) please contact your surgeon as soon as possible. 8. It is important that you be on time. If a situation occurs where you may be late, please call [phone] (OR Holding Area). 9. If you have any questions and or problems, please call [phone] (Pre-admission Testing). 10. Your surgery time may be subject to change. You will receive a phone call the evening prior if your time changes. 11.  If having outpatient surgery, you must have someone to drive you here, stay with you during the duration of your stay, and to drive you home at time of discharge. Special Instructions:    MEDICATIONS TO TAKE THE MORNING OF SURGERY WITH A SIP OF WATER:none      I understand a pre-operative phone call will be made to verify my surgery time. In the event that I am not available, I give permission for a message to be left on my answering service and/or with another person?   yes         ___________________      __________   _________    (Signature of Patient)             (Witness)                (Date and Time)

## 2017-06-01 ENCOUNTER — ANESTHESIA EVENT (OUTPATIENT)
Dept: SURGERY | Age: 77
End: 2017-06-01
Payer: MEDICARE

## 2017-06-01 ENCOUNTER — HOSPITAL ENCOUNTER (OUTPATIENT)
Age: 77
Setting detail: OUTPATIENT SURGERY
Discharge: HOME | End: 2017-06-01
Attending: SURGERY | Admitting: SURGERY
Payer: MEDICARE

## 2017-06-01 ENCOUNTER — ANESTHESIA (OUTPATIENT)
Dept: SURGERY | Age: 77
End: 2017-06-01
Payer: MEDICARE

## 2017-06-01 VITALS
RESPIRATION RATE: 20 BRPM | TEMPERATURE: 97.4 F | HEART RATE: 60 BPM | SYSTOLIC BLOOD PRESSURE: 148 MMHG | HEIGHT: 73 IN | DIASTOLIC BLOOD PRESSURE: 80 MMHG | WEIGHT: 250.88 LBS | BODY MASS INDEX: 33.25 KG/M2 | OXYGEN SATURATION: 95 %

## 2017-06-01 PROCEDURE — C1781 MESH (IMPLANTABLE): HCPCS | Performed by: SURGERY

## 2017-06-01 PROCEDURE — 77030026438 HC STYL ET INTUB CARD -A: Performed by: ANESTHESIOLOGY

## 2017-06-01 PROCEDURE — 76210000006 HC OR PH I REC 0.5 TO 1 HR: Performed by: SURGERY

## 2017-06-01 PROCEDURE — 74011250636 HC RX REV CODE- 250/636: Performed by: SURGERY

## 2017-06-01 PROCEDURE — 77030020782 HC GWN BAIR PAWS FLX 3M -B

## 2017-06-01 PROCEDURE — 77030019908 HC STETH ESOPH SIMS -A: Performed by: ANESTHESIOLOGY

## 2017-06-01 PROCEDURE — 74011250636 HC RX REV CODE- 250/636: Performed by: ANESTHESIOLOGY

## 2017-06-01 PROCEDURE — 77030008684 HC TU ET CUF COVD -B: Performed by: ANESTHESIOLOGY

## 2017-06-01 PROCEDURE — 77030032490 HC SLV COMPR SCD KNE COVD -B: Performed by: SURGERY

## 2017-06-01 PROCEDURE — 77030010507 HC ADH SKN DERMBND J&J -B: Performed by: SURGERY

## 2017-06-01 PROCEDURE — 77030011640 HC PAD GRND REM COVD -A: Performed by: SURGERY

## 2017-06-01 PROCEDURE — 76010000149 HC OR TIME 1 TO 1.5 HR: Performed by: SURGERY

## 2017-06-01 PROCEDURE — 74011000250 HC RX REV CODE- 250

## 2017-06-01 PROCEDURE — 76210000020 HC REC RM PH II FIRST 0.5 HR: Performed by: SURGERY

## 2017-06-01 PROCEDURE — 74011250636 HC RX REV CODE- 250/636

## 2017-06-01 PROCEDURE — 77030018547 HC SUT ETHBND1 J&J -B: Performed by: SURGERY

## 2017-06-01 PROCEDURE — 77030031139 HC SUT VCRL2 J&J -A: Performed by: SURGERY

## 2017-06-01 PROCEDURE — 76060000033 HC ANESTHESIA 1 TO 1.5 HR: Performed by: SURGERY

## 2017-06-01 PROCEDURE — 77030018836 HC SOL IRR NACL ICUM -A: Performed by: SURGERY

## 2017-06-01 PROCEDURE — 77030036554: Performed by: SURGERY

## 2017-06-01 PROCEDURE — 74011000250 HC RX REV CODE- 250: Performed by: SURGERY

## 2017-06-01 DEVICE — MESH SURG W4.3XL4.3CM CIR VENTRAL PTCH FOR TISS SEPARATING: Type: IMPLANTABLE DEVICE | Site: UMBILICAL | Status: FUNCTIONAL

## 2017-06-01 RX ORDER — FENTANYL CITRATE 50 UG/ML
INJECTION, SOLUTION INTRAMUSCULAR; INTRAVENOUS AS NEEDED
Status: DISCONTINUED | OUTPATIENT
Start: 2017-06-01 | End: 2017-06-01 | Stop reason: HOSPADM

## 2017-06-01 RX ORDER — PROMETHAZINE HYDROCHLORIDE 12.5 MG/1
12.5 TABLET ORAL
Qty: 20 TAB | Refills: 0 | Status: SHIPPED | OUTPATIENT
Start: 2017-06-01 | End: 2018-05-21

## 2017-06-01 RX ORDER — FENTANYL CITRATE 50 UG/ML
50 INJECTION, SOLUTION INTRAMUSCULAR; INTRAVENOUS AS NEEDED
Status: DISCONTINUED | OUTPATIENT
Start: 2017-06-01 | End: 2017-06-01 | Stop reason: HOSPADM

## 2017-06-01 RX ORDER — LIDOCAINE HYDROCHLORIDE 10 MG/ML
0.1 INJECTION, SOLUTION EPIDURAL; INFILTRATION; INTRACAUDAL; PERINEURAL AS NEEDED
Status: DISCONTINUED | OUTPATIENT
Start: 2017-06-01 | End: 2017-06-01 | Stop reason: HOSPADM

## 2017-06-01 RX ORDER — DEXAMETHASONE SODIUM PHOSPHATE 4 MG/ML
INJECTION, SOLUTION INTRA-ARTICULAR; INTRALESIONAL; INTRAMUSCULAR; INTRAVENOUS; SOFT TISSUE AS NEEDED
Status: DISCONTINUED | OUTPATIENT
Start: 2017-06-01 | End: 2017-06-01 | Stop reason: HOSPADM

## 2017-06-01 RX ORDER — HYDROMORPHONE HYDROCHLORIDE 1 MG/ML
.2-.5 INJECTION, SOLUTION INTRAMUSCULAR; INTRAVENOUS; SUBCUTANEOUS
Status: DISCONTINUED | OUTPATIENT
Start: 2017-06-01 | End: 2017-06-01 | Stop reason: HOSPADM

## 2017-06-01 RX ORDER — MIDAZOLAM HYDROCHLORIDE 1 MG/ML
1 INJECTION, SOLUTION INTRAMUSCULAR; INTRAVENOUS AS NEEDED
Status: DISCONTINUED | OUTPATIENT
Start: 2017-06-01 | End: 2017-06-01 | Stop reason: HOSPADM

## 2017-06-01 RX ORDER — SODIUM CHLORIDE, SODIUM LACTATE, POTASSIUM CHLORIDE, CALCIUM CHLORIDE 600; 310; 30; 20 MG/100ML; MG/100ML; MG/100ML; MG/100ML
25 INJECTION, SOLUTION INTRAVENOUS CONTINUOUS
Status: DISCONTINUED | OUTPATIENT
Start: 2017-06-01 | End: 2017-06-01 | Stop reason: HOSPADM

## 2017-06-01 RX ORDER — OXYCODONE AND ACETAMINOPHEN 5; 325 MG/1; MG/1
1-2 TABLET ORAL
Qty: 50 TAB | Refills: 0 | Status: SHIPPED | OUTPATIENT
Start: 2017-06-01 | End: 2018-05-21

## 2017-06-01 RX ORDER — SUCCINYLCHOLINE CHLORIDE 20 MG/ML
INJECTION INTRAMUSCULAR; INTRAVENOUS AS NEEDED
Status: DISCONTINUED | OUTPATIENT
Start: 2017-06-01 | End: 2017-06-01 | Stop reason: HOSPADM

## 2017-06-01 RX ORDER — ACETAMINOPHEN 10 MG/ML
INJECTION, SOLUTION INTRAVENOUS AS NEEDED
Status: DISCONTINUED | OUTPATIENT
Start: 2017-06-01 | End: 2017-06-01 | Stop reason: HOSPADM

## 2017-06-01 RX ORDER — GLYCOPYRROLATE 0.2 MG/ML
INJECTION INTRAMUSCULAR; INTRAVENOUS AS NEEDED
Status: DISCONTINUED | OUTPATIENT
Start: 2017-06-01 | End: 2017-06-01 | Stop reason: HOSPADM

## 2017-06-01 RX ORDER — FENTANYL CITRATE 50 UG/ML
25 INJECTION, SOLUTION INTRAMUSCULAR; INTRAVENOUS
Status: DISCONTINUED | OUTPATIENT
Start: 2017-06-01 | End: 2017-06-01 | Stop reason: HOSPADM

## 2017-06-01 RX ORDER — ROCURONIUM BROMIDE 10 MG/ML
INJECTION, SOLUTION INTRAVENOUS AS NEEDED
Status: DISCONTINUED | OUTPATIENT
Start: 2017-06-01 | End: 2017-06-01 | Stop reason: HOSPADM

## 2017-06-01 RX ORDER — LIDOCAINE HYDROCHLORIDE 20 MG/ML
INJECTION, SOLUTION EPIDURAL; INFILTRATION; INTRACAUDAL; PERINEURAL AS NEEDED
Status: DISCONTINUED | OUTPATIENT
Start: 2017-06-01 | End: 2017-06-01 | Stop reason: HOSPADM

## 2017-06-01 RX ORDER — HYDROMORPHONE HYDROCHLORIDE 2 MG/ML
INJECTION, SOLUTION INTRAMUSCULAR; INTRAVENOUS; SUBCUTANEOUS AS NEEDED
Status: DISCONTINUED | OUTPATIENT
Start: 2017-06-01 | End: 2017-06-01 | Stop reason: HOSPADM

## 2017-06-01 RX ORDER — ONDANSETRON 2 MG/ML
INJECTION INTRAMUSCULAR; INTRAVENOUS AS NEEDED
Status: DISCONTINUED | OUTPATIENT
Start: 2017-06-01 | End: 2017-06-01 | Stop reason: HOSPADM

## 2017-06-01 RX ORDER — BUPIVACAINE HYDROCHLORIDE AND EPINEPHRINE 2.5; 5 MG/ML; UG/ML
INJECTION, SOLUTION EPIDURAL; INFILTRATION; INTRACAUDAL; PERINEURAL AS NEEDED
Status: DISCONTINUED | OUTPATIENT
Start: 2017-06-01 | End: 2017-06-01 | Stop reason: HOSPADM

## 2017-06-01 RX ORDER — ONDANSETRON 2 MG/ML
4 INJECTION INTRAMUSCULAR; INTRAVENOUS AS NEEDED
Status: DISCONTINUED | OUTPATIENT
Start: 2017-06-01 | End: 2017-06-01 | Stop reason: HOSPADM

## 2017-06-01 RX ORDER — CEFAZOLIN SODIUM IN 0.9 % NACL 2 G/100 ML
2 PLASTIC BAG, INJECTION (ML) INTRAVENOUS
Status: COMPLETED | OUTPATIENT
Start: 2017-06-01 | End: 2017-06-01

## 2017-06-01 RX ORDER — PROPOFOL 10 MG/ML
INJECTION, EMULSION INTRAVENOUS AS NEEDED
Status: DISCONTINUED | OUTPATIENT
Start: 2017-06-01 | End: 2017-06-01 | Stop reason: HOSPADM

## 2017-06-01 RX ORDER — NEOSTIGMINE METHYLSULFATE 1 MG/ML
INJECTION INTRAVENOUS AS NEEDED
Status: DISCONTINUED | OUTPATIENT
Start: 2017-06-01 | End: 2017-06-01 | Stop reason: HOSPADM

## 2017-06-01 RX ORDER — DOCUSATE SODIUM 100 MG/1
100 CAPSULE, LIQUID FILLED ORAL 2 TIMES DAILY
Qty: 30 CAP | Refills: 0 | Status: SHIPPED | OUTPATIENT
Start: 2017-06-01 | End: 2017-12-14 | Stop reason: SDUPTHER

## 2017-06-01 RX ORDER — DIPHENHYDRAMINE HYDROCHLORIDE 50 MG/ML
12.5 INJECTION, SOLUTION INTRAMUSCULAR; INTRAVENOUS AS NEEDED
Status: DISCONTINUED | OUTPATIENT
Start: 2017-06-01 | End: 2017-06-01 | Stop reason: HOSPADM

## 2017-06-01 RX ADMIN — FENTANYL CITRATE 50 MCG: 50 INJECTION, SOLUTION INTRAMUSCULAR; INTRAVENOUS at 11:19

## 2017-06-01 RX ADMIN — PROPOFOL 30 MG: 10 INJECTION, EMULSION INTRAVENOUS at 12:20

## 2017-06-01 RX ADMIN — PROPOFOL 50 MG: 10 INJECTION, EMULSION INTRAVENOUS at 11:58

## 2017-06-01 RX ADMIN — SUCCINYLCHOLINE CHLORIDE 200 MG: 20 INJECTION INTRAMUSCULAR; INTRAVENOUS at 11:29

## 2017-06-01 RX ADMIN — DEXAMETHASONE SODIUM PHOSPHATE 4 MG: 4 INJECTION, SOLUTION INTRA-ARTICULAR; INTRALESIONAL; INTRAMUSCULAR; INTRAVENOUS; SOFT TISSUE at 11:42

## 2017-06-01 RX ADMIN — HYDROMORPHONE HYDROCHLORIDE 0.25 MG: 2 INJECTION, SOLUTION INTRAMUSCULAR; INTRAVENOUS; SUBCUTANEOUS at 11:44

## 2017-06-01 RX ADMIN — ROCURONIUM BROMIDE 20 MG: 10 INJECTION, SOLUTION INTRAVENOUS at 11:33

## 2017-06-01 RX ADMIN — NEOSTIGMINE METHYLSULFATE 3 MG: 1 INJECTION INTRAVENOUS at 12:14

## 2017-06-01 RX ADMIN — CEFAZOLIN 2 G: 10 INJECTION, POWDER, FOR SOLUTION INTRAVENOUS; PARENTERAL at 11:34

## 2017-06-01 RX ADMIN — LIDOCAINE HYDROCHLORIDE 40 MG: 20 INJECTION, SOLUTION EPIDURAL; INFILTRATION; INTRACAUDAL; PERINEURAL at 12:19

## 2017-06-01 RX ADMIN — PROPOFOL 200 MG: 10 INJECTION, EMULSION INTRAVENOUS at 11:29

## 2017-06-01 RX ADMIN — ACETAMINOPHEN 1000 MG: 10 INJECTION, SOLUTION INTRAVENOUS at 11:42

## 2017-06-01 RX ADMIN — ONDANSETRON 4 MG: 2 INJECTION INTRAMUSCULAR; INTRAVENOUS at 12:14

## 2017-06-01 RX ADMIN — GLYCOPYRROLATE 0.4 MG: 0.2 INJECTION INTRAMUSCULAR; INTRAVENOUS at 12:13

## 2017-06-01 RX ADMIN — FENTANYL CITRATE 50 MCG: 50 INJECTION, SOLUTION INTRAMUSCULAR; INTRAVENOUS at 11:29

## 2017-06-01 RX ADMIN — SODIUM CHLORIDE, SODIUM LACTATE, POTASSIUM CHLORIDE, AND CALCIUM CHLORIDE 25 ML/HR: 600; 310; 30; 20 INJECTION, SOLUTION INTRAVENOUS at 09:25

## 2017-06-01 RX ADMIN — LIDOCAINE HYDROCHLORIDE 100 MG: 20 INJECTION, SOLUTION EPIDURAL; INFILTRATION; INTRACAUDAL; PERINEURAL at 11:29

## 2017-06-01 NOTE — H&P (VIEW-ONLY)
Surgery Consult:  Umbilical hernia  Requesting physician:  Dr. Angela Carvajal    Subjective:   Patient 68 y.o.  male presents with umbilical bulge for 25 years. It hasn't been bothering him until 2 weeks ago when he developed severe sharp periumbilical pain radiating to the back. No obvious palliative factors. Pain worse when lying down. Patient denies any recent trauma. No obstructive symptoms. No nausea or vomiting. Patient with history chronic constipation. Took some miralax last week and is having diarrhea today. Prior abdominal surgeries include open prostatectomy. No F/C/S. No dysuria. Past Medical & Surgical History:  Past Medical History:   Diagnosis Date    Cancer [facility]) 2004    prostate    Hypertension       Past Surgical History:   Procedure Laterality Date    HX CATARACT REMOVAL      bilateral    HX COLONOSCOPY  10/29/2012    Repeat in 10 years    HX CYST REMOVAL      Hands and Pilonidal cyst removal (8 from spine)    HX PROSTATECTOMY  2003       Social History:  Social History     Social History    Marital status:      Spouse name: N/A    Number of children: N/A    Years of education: N/A     Occupational History    Not on file.      Social History Main Topics    Smoking status: Never Smoker    Smokeless tobacco: Never Used      Comment: 3 months at 12years old/cigar use (occasionally 22 yrs old)    Alcohol use 2.4 oz/week     4 Glasses of wine per week    Drug use: No    Sexual activity: Yes     Partners: Female     Other Topics Concern    Not on file     Social History Narrative        Family History:  Family History   Problem Relation Age of Onset    High Cholesterol Mother     Stroke Father     Alcohol abuse Father     Other Father      tobacco abuse    No Known Problems Sister     Cancer Brother      Skin CA    Other Son      Herpes in the eyes (while in [employer])    Cancer Daughter 28     Ovarian CA        Medications:  Current Outpatient Prescriptions   Medication Sig    ibuprofen (MOTRIN) 200 mg tablet Take 400 mg by mouth as needed for Pain.  losartan (COZAAR) 100 mg tablet TAKE 1 TABLET BY MOUTH EVERY DAY     No current facility-administered medications for this visit. Allergies:  No Known Allergies    Review of Systems  A comprehensive review of systems was negative except for that written in the HPI. Objective:     Exam:    Visit Vitals    /82 (BP 1 Location: Right arm, BP Patient Position: Sitting)    Pulse 64    Ht 6' 1\" (1.854 m)    Wt 114.6 kg (252 lb 9.6 oz)    SpO2 96%    BMI 33.33 kg/m2     General appearance: alert, cooperative, no distress, appears stated age  Eyes: negative  Lungs: clear to auscultation bilaterally  Heart: regular rate and rhythm  Abdomen: soft, non-distended. Reducible umbilical hernia with 2 cm fascial defect. Skin overlying the hernia is very thin. Extremities: extremities normal, atraumatic, no cyanosis or edema. HUONG. Skin: Skin color, texture, turgor normal. No rashes or lesions. Neurologic: Grossly normal      Assessment:     Umbilical hernia    Plan:     UHR with mesh. Risks, benefit, and alternative to surgery was discussed with the patient. The risks of surgery include but not limited to infection, bleeding, intraabdominal organ injury, recurrence, and the risks of general anesthetic. Patient is agreeable to surgery. All questions answered.

## 2017-06-01 NOTE — ANESTHESIA POSTPROCEDURE EVALUATION
Post-Anesthesia Evaluation and Assessment    Patient: Dipak Thomas MRN: [id number]  SSN: [id number]    YOB: 1940  Age: 68 y.o. Sex: male       Cardiovascular Function/Vital Signs  Visit Vitals    /80 (BP 1 Location: Right arm, BP Patient Position: At rest)    Pulse (!) 57    Temp 36.3 °C (97.4 °F)    Resp 20    Ht 6' 1\" (1.854 m)    Wt 113.8 kg (250 lb 14.1 oz)    SpO2 95%    BMI 33.1 kg/m2       Patient is status post general anesthesia for Procedure(s): UMBILICAL HERNIA REPAIR WITH MESH. Nausea/Vomiting: None    Postoperative hydration reviewed and adequate. Pain:  Pain Scale 1: Numeric (0 - 10) (06/01/17 1345)  Pain Intensity 1: 0 (06/01/17 1345)   Managed    Neurological Status:   Neuro (WDL): Within Defined Limits (06/01/17 1330)  Neuro  Neurologic State: Alert;Eyes open spontaneously (06/01/17 1330)  Orientation Level: Oriented X4 (06/01/17 1330)  Cognition: Follows commands (06/01/17 1330)  Speech: Clear (06/01/17 1330)  LUE Motor Response: Purposeful (06/01/17 1330)  LLE Motor Response: Purposeful (06/01/17 1330)  RUE Motor Response: Purposeful (06/01/17 1330)  RLE Motor Response: Purposeful (06/01/17 1330)   At baseline    Mental Status and Level of Consciousness: Arousable    Pulmonary Status:   O2 Device: Room air (06/01/17 1345)   Adequate oxygenation and airway patent    Complications related to anesthesia: None    Post-anesthesia assessment completed.  No concerns    Signed By: Susi Silva MD     June 1, 2017

## 2017-06-01 NOTE — INTERVAL H&P NOTE
H&P Update:  Suraj Alberts was seen and examined. History and physical has been reviewed. The patient has been examined.  There have been no significant clinical changes since the completion of the originally dated History and Physical.    Signed By: Stacia Hamilton MD     June 1, 2017 9:24 AM

## 2017-06-01 NOTE — IP AVS SNAPSHOT
[address] [facility] 
[phone] Patient: Alyssa Peña MRN: [id number] VGI:41/[id number]/[id number] You are allergic to the following No active allergies Recent Documentation Height Weight BMI Smoking Status 1.854 m 113.8 kg 33.1 kg/m2 Never Smoker Emergency Contacts Name Discharge Info Relation Home Work Mobile [address] CAREGIVER [3] Daughter [21]   [phone] Gabrielle Gil DISCHARGE CAREGIVER [3] Spouse [3] [phone] About your hospitalization You were admitted on:  June 1, 2017 You last received care in the:  [facility] PACU You were discharged on:  June 1, 2017 Unit phone number:  [phone] Why you were hospitalized Your primary diagnosis was:  Not on File Providers Seen During Your Hospitalizations Provider Role Specialty Primary office phone Geni Chris MD Attending Provider General Surgery [phone] Your Primary Care Physician (PCP) Primary Care Physician Office Phone Office Fax Trev Lutz [phone] Follow-up Information Follow up With Details Comments Contact Info Mela Boles MD   [address] [facility] [address] [facility] 
[phone] Current Discharge Medication List  
  
START taking these medications Dose & Instructions Dispensing Information Comments Morning Noon Evening Bedtime  
 docusate sodium 100 mg capsule Commonly known as:  Barbie Glynn Your last dose was: Your next dose is:    
   
   
 Dose:  100 mg Take 1 Cap by mouth two (2) times a day. Quantity:  30 Cap Refills:  0  
     
   
   
   
  
 oxyCODONE-acetaminophen 5-325 mg per tablet Commonly known as:  PERCOCET Your last dose was: Your next dose is:    
   
   
 Dose:  1-2 Tab Take 1-2 Tabs by mouth every four (4) hours as needed for Pain. Max Daily Amount: 12 Tabs. Quantity:  50 Tab Refills:  0  
     
   
   
   
  
 promethazine 12.5 mg tablet Commonly known as:  PHENERGAN Your last dose was: Your next dose is:    
   
   
 Dose:  12.5 mg Take 1 Tab by mouth every six (6) hours as needed for Nausea. Quantity:  20 Tab Refills:  0 CONTINUE these medications which have NOT CHANGED Dose & Instructions Dispensing Information Comments Morning Noon Evening Bedtime  
 ibuprofen 200 mg tablet Commonly known as:  MOTRIN Your last dose was: Your next dose is:    
   
   
 Dose:  400 mg Take 400 mg by mouth as needed for Pain. Refills:  0  
     
   
   
   
  
 losartan 100 mg tablet Commonly known as:  COZAAR Your last dose was: Your next dose is: TAKE 1 TABLET BY MOUTH EVERY DAY Quantity:  90 Tab Refills:  3 Where to Get Your Medications Information on where to get these meds will be given to you by the nurse or doctor. ! Ask your nurse or doctor about these medications  
  docusate sodium 100 mg capsule  
 oxyCODONE-acetaminophen 5-325 mg per tablet  
 promethazine 12.5 mg tablet Discharge Instructions Patient Discharge Instructions Adis Griffiths / [id number] : 1940 Admitted 2017 Discharged: 2017 What to do at [facility] Recommended diet: Regular Diet Recommended activity: no heavy lifting > 20 lbs x 6 weeks; no driving while taking narcotics for pain. May take shower, but no bath x 2 weeks. Keep ice over the incision to minimize swelling. Please wear abdominal binder when ambulating. If you experience a lot of drainage, develop redness around the wound, or a fever over 101 F occurs please call the office. Narcotics and anesthesia sometimes cause nausea and vomiting. If persistent please call the office. Do not hesitate to call with questions or concerns. Follow-up with Dr. Constantine Alicia in 2 weeks. Please call [phone] for an appointment. Narcotic-Analgesic/Acetaminophen (Percocet, Norco, Lorcet HD, Lortab 10/325) - (By mouth) Why this medicine is used:  
Relieves pain. Contact a nurse or doctor right away if you have: 
· Extreme weakness, shallow breathing, slow heartbeat · Severe confusion, lightheadedness, dizziness, fainting · Yellow skin or eyes, dark urine or pale stools · Severe constipation, severe stomach pain, nausea, vomiting, loss of appetite · Sweating or cold, clammy skin Common side effects: · Mild constipation, nausea, vomiting · Sleepiness, tiredness · Itching, rash © 2017 [address] [employer] Information is for End User's use only and may not be sold, redistributed or otherwise used for commercial purposes. Laxative, Stimulant Combination (Nita-Colace, Doc-Q-Lax, Doculax Plus, Senexon-S) - (By mouth) Why this medicine is used:  
Treats constipation by helping you have a bowel movement. Contact a nurse or doctor right away if you have: 
· Yellow skin or eyes · Dark urine or pale stools, vomiting, loss of appetite, stomach pain Common side effects: 
· Nausea, diarrhea, stomach cramps, bitter taste in mouth · Urine turns a different color © 2017 [address] Information is for End User's use only and may not be sold, redistributed or otherwise used for commercial purposes. Promethazine (Phenergan, Promacot) - (By mouth) Why this medicine is used:  
Treats allergies and motion sickness. Also helps control nausea and vomiting. Contact a nurse or doctor right away if you have: 
· Fast, pounding, or uneven heartbeat; lightheadedness or fainting · Slow heartbeat, trouble breathing or speaking · Extreme tiredness or weakness · Fever, sweating, confusion, uneven heartbeat, muscle stiffness · Twitching, muscle movements you cannot control Common side effects: 
· Drowsiness · Nausea, vomiting, constipation, dry mouth © 2017 [address] Information is for End User's use only and may not be sold, redistributed or otherwise used for commercial purposes. DISCHARGE SUMMARY from Nurse The following personal items are in your possession at time of discharge: 
 
Dental Appliances: None Visual Aid: None Home Medications: None Jewelry: None Clothing: Undergarments, Shirt, Dress, Shorts, Footwear Other Valuables: None Personal Items Sent to Safe: declined PATIENT INSTRUCTIONS: 
 
After general anesthesia or intravenous sedation, for 24 hours or while taking prescription Narcotics: · Limit your activities · Do not drive and operate hazardous machinery · Do not make important personal or business decisions · Do  not drink alcoholic beverages · If you have not urinated within 8 hours after discharge, please contact your surgeon on call. Report the following to your surgeon: 
· Excessive pain, swelling, redness or odor of or around the surgical area · Temperature over 100.5 · Nausea and vomiting lasting longer than 4 hours or if unable to take medications · Any signs of decreased circulation or nerve impairment to extremity: change in color, persistent  numbness, tingling, coldness or increase pain · Any questions These are general instructions for a healthy lifestyle: No smoking/ No tobacco products/ Avoid exposure to second hand smoke Surgeon General's Warning:  Quitting smoking now greatly reduces serious risk to your health. Obesity, smoking, and sedentary lifestyle greatly increases your risk for illness A healthy diet, regular physical exercise & weight monitoring are important for maintaining a healthy lifestyle You may be retaining fluid if you have a history of heart failure or if you experience any of the following symptoms:  Weight gain of 3 pounds or more overnight or 5 pounds in a week, increased swelling in our hands or feet or shortness of breath while lying flat in bed. Please call your doctor as soon as you notice any of these symptoms; do not wait until your next office visit. Recognize signs and symptoms of STROKE: 
 
F-face looks uneven A-arms unable to move or move unevenly S-speech slurred or non-existent T-time-call 911 as soon as signs and symptoms begin-DO NOT go Back to bed or wait to see if you get better-TIME IS BRAIN. Warning Signs of HEART ATTACK Call 911 if you have these symptoms: 
? Chest discomfort. Most heart attacks involve discomfort in the center of the chest that lasts more than a few minutes, or that goes away and comes back. It can feel like uncomfortable pressure, squeezing, fullness, or pain. ? Discomfort in other areas of the upper body. Symptoms can include pain or discomfort in one or both arms, the back, neck, jaw, or stomach. ? Shortness of breath with or without chest discomfort. ? Other signs may include breaking out in a cold sweat, nausea, or lightheadedness. Don't wait more than five minutes to call [address]! Fast action can save your life. Calling 911 is almost always the fastest way to get lifesaving treatment. Emergency Medical Services staff can begin treatment when they arrive  up to an hour sooner than if someone gets to the hospital by car. The discharge information has been reviewed with the patient and spouse. The patient and spouse verbalized understanding. Discharge medications reviewed with the patient and spouse and appropriate educational materials and side effects teaching were provided.  
 
 
 
 
 
 
 
 
Information obtained by : 
I understand that if any problems occur once I am at home I am to contact my physician. I understand and acknowledge receipt of the instructions indicated above. Discharge Orders None Introducing [facility] SERVICES! Tanis Epley introduces [employer] patient portal. Now you can access parts of your medical record, email your doctor's office, and request medication refills online. 1. In your internet browser, go to [email or web address] 2. Click on the First Time User? Click Here link in the Sign In box. You will see the New Member Sign Up page. 3. Enter your [employer] Access Code exactly as it appears below. You will not need to use this code after youve completed the sign-up process. If you do not sign up before the expiration date, you must request a new code. · [employer] Access Code: [email or web address] Expires: 7/30/2017  2:51 PM 
 
4. Enter the last four digits of your Social Security Number (xxxx) and Date of Birth (mm/dd/yyyy) as indicated and click Submit. You will be taken to the next sign-up page. 5. Create a [employer] ID. This will be your [employer] login ID and cannot be changed, so think of one that is secure and easy to remember. 6. Create a [employer] password. You can change your password at any time. 7. Enter your Password Reset Question and Answer. This can be used at a later time if you forget your password. 8. Enter your e-mail address. You will receive e-mail notification when new information is available in [address] 9. Click Sign Up. You can now view and download portions of your medical record. 10. Click the Download Summary menu link to download a portable copy of your medical information. If you have questions, please visit the Frequently Asked Questions section of the [employer] website. Remember, [employer] is NOT to be used for urgent needs. For medical emergencies, dial 911. Now available from your iPhone and Android! General Information Please provide this summary of care documentation to your next provider. Patient Signature:  ____________________________________________________________ Date:  ____________________________________________________________  
  
Theone Fisher Provider Signature:  ____________________________________________________________ Date:  ____________________________________________________________

## 2017-06-01 NOTE — PERIOP NOTES
Handoff Report from Operating Room to PACU    Report received from Stephanie Mccurdy CRNA and Deondre Shipley RN regarding Jannette Tyler. Surgeon(s):  Olinda Michael MD  And Procedure(s) (LRB):  UMBILICAL HERNIA REPAIR WITH MESH (N/A)  confirmed   with allergies and dressings discussed. Anesthesia type, drugs, patient history, complications, estimated blood loss, vital signs, intake and output, and last pain medication, lines and temperature were reviewed.

## 2017-06-01 NOTE — OP NOTES
Patient ID:   Name: Chao Hernandez   Medical Record Number: [id number]   YOB: 1940    Date of Surgery: 6/1/2017     Preoperative Diagnosis:   Umbilical hernia    Postoperative Diagnosis:  Umbilical hernia    Procedure:  Umbilical hernia repair with mesh    Surgeon: Pako Reynaga MD     Anesthesia: General    Estimated Blood Loss:  2 cc    Indications:  Patient 68 y.o.  male presents with umbilical bulge for 25 years. It hasn't been bothering him until 2 weeks ago when he developed severe sharp periumbilical pain radiating to the back. Patient presents today for repair. Procedure Details: The patient was seen in the Holding Room. The risks, benefits, complications, treatment options, and expected outcomes were discussed with the patient. The site of surgery properly noted/marked. After informed consent was performed, patient was taken to the operating room and was placed supine in the operating table. A Time Out was held and the above information confirmed. After establishing general anesthesia, abdomen was prepped and draped in standard surgical fashion. Small infraumbilical incision was made followed by dissection down to the fascia. After circumscribing umbilical stump, umbilical stump was  from the fascia using sharp dissection. The peritoneal defect was closed with running 2-0 Vicryl. Patient was found to have 2 cm fascial defect. Preperitoneal space was developed for mesh placement. A 4.3 cm PVPS mesh was placed preperitoneal and the fascial defect was closed with interrupted 0-0 Ethibond. Umbilical stump was then tacked to the fascia using 3-0 Vicryl. Skin was then closed using 4-0 Vicryl subcuticular stitch followed by Dermabond. Instrument, sponge, and needle counts were correct prior to closure and at the conclusion of the case. The patient was taken to recovery room in good condition having tolerated the procedure well.             Implants: Implant Name Type Inv.  Item Serial No.  Lot No. LRB No. Used   MESH RIMA PROCEED 4.3X4.3CM -- 2/BX - SNA   MESH RIMA PROCEED 4.3X4.3CM -- 2/BX NA [employer] [id number] N/A 1       CC: Penny Freeman MD

## 2017-06-01 NOTE — IP AVS SNAPSHOT
Current Discharge Medication List  
  
START taking these medications Dose & Instructions Dispensing Information Comments Morning Noon Evening Bedtime  
 docusate sodium 100 mg capsule Commonly known as:  José Lopes Your last dose was: Your next dose is:    
   
   
 Dose:  100 mg Take 1 Cap by mouth two (2) times a day. Quantity:  30 Cap Refills:  0  
     
   
   
   
  
 oxyCODONE-acetaminophen 5-325 mg per tablet Commonly known as:  PERCOCET Your last dose was: Your next dose is:    
   
   
 Dose:  1-2 Tab Take 1-2 Tabs by mouth every four (4) hours as needed for Pain. Max Daily Amount: 12 Tabs. Quantity:  50 Tab Refills:  0  
     
   
   
   
  
 promethazine 12.5 mg tablet Commonly known as:  PHENERGAN Your last dose was: Your next dose is:    
   
   
 Dose:  12.5 mg Take 1 Tab by mouth every six (6) hours as needed for Nausea. Quantity:  20 Tab Refills:  0 CONTINUE these medications which have NOT CHANGED Dose & Instructions Dispensing Information Comments Morning Noon Evening Bedtime  
 ibuprofen 200 mg tablet Commonly known as:  MOTRIN Your last dose was: Your next dose is:    
   
   
 Dose:  400 mg Take 400 mg by mouth as needed for Pain. Refills:  0  
     
   
   
   
  
 losartan 100 mg tablet Commonly known as:  COZAAR Your last dose was: Your next dose is: TAKE 1 TABLET BY MOUTH EVERY DAY Quantity:  90 Tab Refills:  3 Where to Get Your Medications Information on where to get these meds will be given to you by the nurse or doctor. ! Ask your nurse or doctor about these medications  
  docusate sodium 100 mg capsule  
 oxyCODONE-acetaminophen 5-325 mg per tablet  
 promethazine 12.5 mg tablet

## 2017-06-01 NOTE — ANESTHESIA PREPROCEDURE EVALUATION
Anesthetic History   No history of anesthetic complications            Review of Systems / Medical History  Patient summary reviewed, nursing notes reviewed and pertinent labs reviewed    Pulmonary  Within defined limits                 Neuro/Psych   Within defined limits           Cardiovascular    Hypertension              Exercise tolerance: >4 METS     GI/Hepatic/Renal  Within defined limits              Endo/Other        Obesity and arthritis  Pertinent negatives: No morbid obesity   Other Findings              Physical Exam    Airway  Mallampati: II  TM Distance: 4 - 6 cm  Neck ROM: normal range of motion   Mouth opening: Normal     Cardiovascular  Regular rate and rhythm,  S1 and S2 normal,  no murmur, click, rub, or gallop             Dental  No notable dental hx       Pulmonary  Breath sounds clear to auscultation               Abdominal  GI exam deferred       Other Findings            Anesthetic Plan    ASA: 2  Anesthesia type: general    Monitoring Plan: BIS      Induction: Intravenous  Anesthetic plan and risks discussed with: Patient

## 2017-06-01 NOTE — PERIOP NOTES
Spoke with connie Bunch for patient to continue to next phase of care. Will sign out shortly-currently is dealing with an emergency. Report given to CALLIE Cuevas RN in phase 2.

## 2017-06-01 NOTE — DISCHARGE INSTRUCTIONS
Patient Discharge Instructions    Sabina Garcia / [id number] : 1940    Admitted 2017 Discharged: 2017         What to do at Home    Recommended diet: Regular Diet    Recommended activity: no heavy lifting > 20 lbs x 6 weeks; no driving while taking narcotics for pain. May take shower, but no bath x 2 weeks. Keep ice over the incision to minimize swelling. Please wear abdominal binder when ambulating. If you experience a lot of drainage, develop redness around the wound, or a fever over 101 F occurs please call the office. Narcotics and anesthesia sometimes cause nausea and vomiting. If persistent please call the office. Do not hesitate to call with questions or concerns. Follow-up with Dr. Ana Luisa Rangel in 2 weeks. Please call [phone] for an appointment. Narcotic-Analgesic/Acetaminophen (Percocet, Norco, Lorcet HD, Lortab 10325) - (By mouth)   Why this medicine is used:   Relieves pain. Contact a nurse or doctor right away if you have:  · Extreme weakness, shallow breathing, slow heartbeat  · Severe confusion, lightheadedness, dizziness, fainting  · Yellow skin or eyes, dark urine or pale stools  · Severe constipation, severe stomach pain, nausea, vomiting, loss of appetite  · Sweating or cold, clammy skin     Common side effects:  · Mild constipation, nausea, vomiting  · Sleepiness, tiredness  · Itching, rash  ©  [facility] Information is for End User's use only and may not be sold, redistributed or otherwise used for commercial purposes. Laxative, Stimulant Combination (Nita-Colace, Doc-Q-Lax, Doculax Plus, Senexon-S) - (By mouth)   Why this medicine is used:   Treats constipation by helping you have a bowel movement.   Contact a nurse or doctor right away if you have:  · Yellow skin or eyes  · Dark urine or pale stools, vomiting, loss of appetite, stomach pain     Common side effects:  · Nausea, diarrhea, stomach cramps, bitter taste in mouth  · Urine turns a different color  © 2017 [address] [facility] Information is for End User's use only and may not be sold, redistributed or otherwise used for commercial purposes. Promethazine (Phenergan, Promacot) - (By mouth)   Why this medicine is used:   Treats allergies and motion sickness. Also helps control nausea and vomiting. Contact a nurse or doctor right away if you have:  · Fast, pounding, or uneven heartbeat; lightheadedness or fainting  · Slow heartbeat, trouble breathing or speaking  · Extreme tiredness or weakness  · Fever, sweating, confusion, uneven heartbeat, muscle stiffness  · Twitching, muscle movements you cannot control     Common side effects:  · Drowsiness  · Nausea, vomiting, constipation, dry mouth  © 2017 [address]  Information is for End User's use only and may not be sold, redistributed or otherwise used for commercial purposes. DISCHARGE SUMMARY from Nurse    The following personal items are in your possession at time of discharge:    Dental Appliances: None  Visual Aid: None     Home Medications: None  Jewelry: None  Clothing: Undergarments, Shirt, Dress, Shorts, Footwear  Other Valuables: None  Personal Items Sent to Safe: declined    PATIENT INSTRUCTIONS:    After general anesthesia or intravenous sedation, for 24 hours or while taking prescription Narcotics:  · Limit your activities  · Do not drive and operate hazardous machinery  · Do not make important personal or business decisions  · Do  not drink alcoholic beverages  · If you have not urinated within 8 hours after discharge, please contact your surgeon on call.     Report the following to your surgeon:  · Excessive pain, swelling, redness or odor of or around the surgical area  · Temperature over 100.5  · Nausea and vomiting lasting longer than 4 hours or if unable to take medications  · Any signs of decreased circulation or nerve impairment to extremity: change in color, persistent numbness, tingling, coldness or increase pain  · Any questions    These are general instructions for a healthy lifestyle:    No smoking/ No tobacco products/ Avoid exposure to second hand smoke    Surgeon General's Warning:  Quitting smoking now greatly reduces serious risk to your health. Obesity, smoking, and sedentary lifestyle greatly increases your risk for illness    A healthy diet, regular physical exercise & weight monitoring are important for maintaining a healthy lifestyle    You may be retaining fluid if you have a history of heart failure or if you experience any of the following symptoms:  Weight gain of 3 pounds or more overnight or 5 pounds in a week, increased swelling in our hands or feet or shortness of breath while lying flat in bed. Please call your doctor as soon as you notice any of these symptoms; do not wait until your next office visit. Recognize signs and symptoms of STROKE:    F-face looks uneven    A-arms unable to move or move unevenly    S-speech slurred or non-existent    T-time-call 911 as soon as signs and symptoms begin-DO NOT go       Back to bed or wait to see if you get better-TIME IS BRAIN. Warning Signs of HEART ATTACK     Call 911 if you have these symptoms:   Chest discomfort. Most heart attacks involve discomfort in the center of the chest that lasts more than a few minutes, or that goes away and comes back. It can feel like uncomfortable pressure, squeezing, fullness, or pain.  Discomfort in other areas of the upper body. Symptoms can include pain or discomfort in one or both arms, the back, neck, jaw, or stomach.  Shortness of breath with or without chest discomfort.  Other signs may include breaking out in a cold sweat, nausea, or lightheadedness. Don't wait more than five minutes to call 911 - MINUTES MATTER! Fast action can save your life. Calling 911 is almost always the fastest way to get lifesaving treatment.  Emergency Medical Services staff can begin treatment when they arrive -- up to an hour sooner than if someone gets to the hospital by car. The discharge information has been reviewed with the patient and spouse. The patient and spouse verbalized understanding. Discharge medications reviewed with the patient and spouse and appropriate educational materials and side effects teaching were provided. Information obtained by :  I understand that if any problems occur once I am at home I am to contact my physician. I understand and acknowledge receipt of the instructions indicated above.

## 2017-06-13 ENCOUNTER — OFFICE VISIT (OUTPATIENT)
Dept: SURGERY | Age: 77
End: 2017-06-13

## 2017-06-13 VITALS
BODY MASS INDEX: 33.29 KG/M2 | TEMPERATURE: 98.2 F | WEIGHT: 251.2 LBS | OXYGEN SATURATION: 96 % | HEIGHT: 73 IN | DIASTOLIC BLOOD PRESSURE: 83 MMHG | HEART RATE: 64 BPM | RESPIRATION RATE: 18 BRPM | SYSTOLIC BLOOD PRESSURE: 148 MMHG

## 2017-06-13 DIAGNOSIS — Z09 POSTOPERATIVE EXAMINATION: Primary | ICD-10-CM

## 2017-06-13 NOTE — PROGRESS NOTES
Patient is here for follow-up. Patient underwent UHR with mesh on 6/1/17. Doing well. No complaints. PE:  Visit Vitals    /83 (BP 1 Location: Left arm, BP Patient Position: Sitting)    Pulse 64    Temp 98.2 °F (36.8 °C) (Oral)    Resp 18    Ht 6' 1\" (1.854 m)    Wt 113.9 kg (251 lb 3.2 oz)    SpO2 96%    BMI 33.14 kg/m2     Abdomen:  [employer] C/D/I.     Assessment:  S/p UHR with mesh    Plan:  -f/u prn

## 2017-06-13 NOTE — MR AVS SNAPSHOT
Visit Information Date & Time Provider Department Dept. Phone Encounter #  
 6/13/2017 11:00 AM Casandra Ramirez MD [facility] [place] [address] [id number] Upcoming Health Maintenance Date Due ZOSTER VACCINE AGE 60> 10/16/2000 GLAUCOMA SCREENING Q2Y 10/16/2005 Pneumococcal 65+ High/Highest Risk (2 of 2 - PCV13) 10/30/2014 INFLUENZA AGE 9 TO ADULT 8/1/2017 MEDICARE YEARLY EXAM 5/2/2018 COLONOSCOPY 10/29/2022 DTaP/Tdap/Td series (2 - Td) 10/30/2023 Allergies as of 6/13/2017  Review Complete On: 6/13/2017 By: Casandra Ramirez MD  
 No Known Allergies Current Immunizations  Reviewed on 5/1/2017 Name Date Pneumococcal Polysaccharide (PPSV-23) 10/30/2013 Tdap 10/30/2013 Not reviewed this visit You Were Diagnosed With   
  
 Codes Comments Postoperative examination    -  Primary ICD-10-CM: M31 ICD-9-CM: V67.00 Vitals BP Pulse Temp Resp Height(growth percentile) Weight(growth percentile) 148/83 (BP 1 Location: Left arm, BP Patient Position: Sitting) 64 98.2 °F (36.8 °C) (Oral) 18 6' 1\" (1.854 m) 251 lb 3.2 oz (113.9 kg) SpO2 BMI Smoking Status 96% 33.14 kg/m2 Never Smoker BMI and BSA Data Body Mass Index Body Surface Area  
 33.14 kg/m 2 2.42 m 2 Preferred Pharmacy Pharmacy Name Phone [id number] [address] [phone] Your Updated Medication List  
  
   
This list is accurate as of: 6/13/17 11:59 PM.  Always use your most recent med list.  
  
  
  
  
 docusate sodium 100 mg capsule Commonly known as:  Cindra Jacks Take 1 Cap by mouth two (2) times a day. ibuprofen 200 mg tablet Commonly known as:  MOTRIN Take 400 mg by mouth as needed for Pain.  
  
 losartan 100 mg tablet Commonly known as:  COZAAR  
TAKE 1 TABLET BY MOUTH EVERY DAY  
  
 oxyCODONE-acetaminophen 5-325 mg per tablet Commonly known as:  PERCOCET Take 1-2 Tabs by mouth every four (4) hours as needed for Pain. Max Daily Amount: 12 Tabs. promethazine 12.5 mg tablet Commonly known as:  PHENERGAN Take 1 Tab by mouth every six (6) hours as needed for Nausea. Introducing [facility] & HEALTH SERVICES! [facility] introduces [employer] patient portal. Now you can access parts of your medical record, email your doctor's office, and request medication refills online. 1. In your internet browser, go to [email or web address] 2. Click on the First Time User? Click Here link in the Sign In box. You will see the New Member Sign Up page. 3. Enter your [employer] Access Code exactly as it appears below. You will not need to use this code after youve completed the sign-up process. If you do not sign up before the expiration date, you must request a new code. · [employer] Access Code: [email or web address] Expires: 7/30/2017  2:51 PM 
 
4. Enter the last four digits of your Social Security Number (xxxx) and Date of Birth (mm/dd/yyyy) as indicated and click Submit. You will be taken to the next sign-up page. 5. Create a [employer] ID. This will be your [employer] login ID and cannot be changed, so think of one that is secure and easy to remember. 6. Create a [employer] password. You can change your password at any time. 7. Enter your Password Reset Question and Answer. This can be used at a later time if you forget your password. 8. Enter your e-mail address. You will receive e-mail notification when new information is available in [address] 9. Click Sign Up. You can now view and download portions of your medical record. 10. Click the Download Summary menu link to download a portable copy of your medical information. If you have questions, please visit the Frequently Asked Questions section of the [employer] website. Remember, [employer] is NOT to be used for urgent needs. For medical emergencies, dial 911. Now available from your iPhone and Android! Please provide this summary of care documentation to your next provider. Your primary care clinician is listed as Miguelito SANCHEZ. If you have any questions after today's visit, please call [phone].

## 2017-09-16 RX ORDER — LOSARTAN POTASSIUM 100 MG/1
TABLET ORAL
Qty: 90 TAB | Refills: 1 | Status: SHIPPED | OUTPATIENT
Start: 2017-09-16 | End: 2018-03-12 | Stop reason: SDUPTHER

## 2017-12-14 RX ORDER — DOCUSATE SODIUM 100 MG/1
CAPSULE, LIQUID FILLED ORAL
Qty: 30 CAP | Refills: 0 | Status: SHIPPED | OUTPATIENT
Start: 2017-12-14 | End: 2017-12-14 | Stop reason: SDUPTHER

## 2017-12-14 RX ORDER — DOCUSATE SODIUM 100 MG/1
CAPSULE, LIQUID FILLED ORAL
Qty: 30 CAP | Refills: 11 | Status: SHIPPED | OUTPATIENT
Start: 2017-12-14 | End: 2021-08-24

## 2018-03-12 RX ORDER — LOSARTAN POTASSIUM 100 MG/1
TABLET ORAL
Qty: 90 TAB | Refills: 1 | Status: SHIPPED | OUTPATIENT
Start: 2018-03-12 | End: 2018-08-30 | Stop reason: SDUPTHER

## 2018-05-21 ENCOUNTER — OFFICE VISIT (OUTPATIENT)
Dept: INTERNAL MEDICINE CLINIC | Age: 78
End: 2018-05-21

## 2018-05-21 VITALS
SYSTOLIC BLOOD PRESSURE: 137 MMHG | BODY MASS INDEX: 31.08 KG/M2 | WEIGHT: 250 LBS | OXYGEN SATURATION: 96 % | TEMPERATURE: 98 F | HEART RATE: 67 BPM | HEIGHT: 75 IN | DIASTOLIC BLOOD PRESSURE: 71 MMHG

## 2018-05-21 DIAGNOSIS — R73.09 ELEVATED GLUCOSE: ICD-10-CM

## 2018-05-21 DIAGNOSIS — Z23 ENCOUNTER FOR IMMUNIZATION: ICD-10-CM

## 2018-05-21 DIAGNOSIS — L98.9 SKIN LESIONS: ICD-10-CM

## 2018-05-21 DIAGNOSIS — E66.9 OBESITY (BMI 30.0-34.9): ICD-10-CM

## 2018-05-21 DIAGNOSIS — Z00.00 MEDICARE ANNUAL WELLNESS VISIT, SUBSEQUENT: ICD-10-CM

## 2018-05-21 DIAGNOSIS — R73.03 PREDIABETES: Primary | ICD-10-CM

## 2018-05-21 DIAGNOSIS — I10 ESSENTIAL HYPERTENSION, BENIGN: ICD-10-CM

## 2018-05-21 NOTE — PATIENT INSTRUCTIONS
Medicare Wellness Visit, Male    The best way to live healthy is to have a lifestyle where you eat a well-balanced diet, exercise regularly, limit alcohol use, and quit all forms of tobacco/nicotine, if applicable. Regular preventive services are another way to keep healthy. Preventive services (vaccines, screening tests, monitoring & exams) can help personalize your care plan, which helps you manage your own care. Screening tests can find health problems at the earliest stages, when they are easiest to treat. [facility] follows the current, evidence-based guidelines published by the [facility] Milad Andria ([facility]) when recommending preventive services for our patients. Because we follow these guidelines, sometimes recommendations change over time as research supports it. (For example, a prostate screening blood test is no longer routinely recommended for men with no symptoms.)    Of course, you and your provider may decide to screen more often for some diseases, based on your risk and co-morbidities (chronic disease you are already diagnosed with). Preventive services for you include:    - Medicare offers their members a free annual wellness visit, which is time for you and your primary care provider to discuss and plan for your preventive service needs. Take advantage of this benefit every year!    -All people over age 72 should receive the recommended pneumonia vaccines. Current USPSTF guidelines recommend a series of two vaccines for the best pneumonia protection.     -All adults should have a yearly flu vaccine and a tetanus vaccine every 10 years.  All adults age 61 years should receive a shingles vaccine once in their lifetime.      -All adults age 38-68 years who are overweight should have a diabetes screening test once every three years.     -Other screening tests & preventive services for persons with diabetes include: an eye exam to screen for diabetic retinopathy, a kidney function test, a foot exam, and stricter control over your cholesterol.     -Cardiovascular screening for adults with routine risk involves an electrocardiogram (ECG) at intervals determined by the provider.     -Colorectal cancer screenings should be done for adults age 54-65 years with normal risk. There are a number of acceptable methods of screening for this type of cancer. Each test has its own benefits and drawbacks. Discuss with your provider what is most appropriate for you during your annual wellness visit. The different tests include: colonoscopy (considered the best screening method), a fecal occult blood test, a fecal DNA test, and sigmoidoscopy.    -All adults born between [facility] should be screened once for Hepatitis C.    -An Abdominal Aortic Aneurysm (AAA) Screening is recommended for men age 73-68 who has ever smoked in their lifetime.      Here is a list of your current Health Maintenance items (your personalized list of preventive services) with a due date:  Health Maintenance Due   Topic Date Due    Shingles Vaccine  08/16/2000    Glaucoma Screening   10/16/2005    Pneumococcal Vaccine (2 of 2 - PCV13) 10/30/2014    Annual Well Visit  05/02/2018

## 2018-05-21 NOTE — MR AVS SNAPSHOT
[address] 
[phone] Patient: Soto Steele MRN:  [id number] Visit Information Date & Time Provider Department Dept. Phone Encounter #  
 5/21/2018 10:30 AM Nimesh Weaver, [address] [place] [address],[address] [phone] [id number] Follow-up Instructions Return in about 1 year (around 5/21/2019) for cpe. Upcoming Health Maintenance Date Due ZOSTER VACCINE AGE 60> 8/16/2000 GLAUCOMA SCREENING Q2Y 10/16/2005 Pneumococcal 65+ High/Highest Risk (2 of 2 - PCV13) 10/30/2014 MEDICARE YEARLY EXAM 5/2/2018 Influenza Age 5 to Adult 8/1/2018 COLONOSCOPY 10/29/2022 DTaP/Tdap/Td series (2 - Td) 10/30/2023 Allergies as of 5/21/2018  Review Complete On: 5/21/2018 By: Linden Alberto LPN No Known Allergies Current Immunizations  Reviewed on 5/1/2017 Name Date Pneumococcal Polysaccharide (PPSV-23) 10/30/2013 Tdap 10/30/2013 Not reviewed this visit You Were Diagnosed With   
  
 Codes Comments Prediabetes    -  Primary ICD-10-CM: R73.03 
ICD-9-CM: 790.29 Essential hypertension, benign     ICD-10-CM: I10 
ICD-9-CM: 401.1 Obesity (BMI 30.0-34.9)     ICD-10-CM: U07.0 ICD-9-CM: 278.00 Elevated glucose     ICD-10-CM: R73.09 
ICD-9-CM: 790.29 Skin lesions     ICD-10-CM: L98.9 ICD-9-CM: 709.9 Vitals BP Pulse Temp Height(growth percentile) Weight(growth percentile) SpO2  
 137/71 (BP 1 Location: Left arm, BP Patient Position: Sitting) 67 98 °F (36.7 °C) (Oral) 6' 3\" (1.905 m) 250 lb (113.4 kg) 96% BMI Smoking Status 31.25 kg/m2 Never Smoker BMI and BSA Data Body Mass Index Body Surface Area  
 31.25 kg/m 2 2.45 m 2 Preferred Pharmacy Pharmacy Name Phone [id number] [place] [facility], [address] [place] [address] [phone] Your Updated Medication List  
  
   
This list is accurate as of 5/21/18 11:34 AM.  Always use your most recent med list.  
  
  
  
  
 docusate sodium 100 mg capsule Commonly known as:  COLACE  
TAKE ONE CAPSULE BY MOUTH TWICE A DAY  
  
 ibuprofen 200 mg tablet Commonly known as:  MOTRIN Take 400 mg by mouth as needed for Pain.  
  
 losartan 100 mg tablet Commonly known as:  COZAAR  
TAKE 1 TABLET BY MOUTH EVERY DAY  
  
 oxyCODONE-acetaminophen 5-325 mg per tablet Commonly known as:  PERCOCET Take 1-2 Tabs by mouth every four (4) hours as needed for Pain. Max Daily Amount: 12 Tabs. promethazine 12.5 mg tablet Commonly known as:  PHENERGAN Take 1 Tab by mouth every six (6) hours as needed for Nausea. We Performed the Following CBC W/O DIFF [51019 CPT(R)] CBC W/O DIFF [36339 CPT(R)] HEMOGLOBIN A1C WITH EAG [62040 CPT(R)] HEMOGLOBIN A1C WITH EAG [76979 CPT(R)] LIPID PANEL [91636 CPT(R)] LIPID PANEL [15910 CPT(R)] METABOLIC PANEL, COMPREHENSIVE [41393 CPT(R)] METABOLIC PANEL, COMPREHENSIVE [93946 CPT(R)] REFERRAL TO DERMATOLOGY [REF19 Custom] Follow-up Instructions Return in about 1 year (around 5/21/2019) for cpe. Referral Information Referral ID Referred By Referred To  
  
 [id number] Kiah SANCHEZ MD   
   [facility] [place] Phone: [phone] Fax: [phone] Visits Status Start Date End Date 1 New Request 5/21/18 5/21/19 If your referral has a status of pending review or denied, additional information will be sent to support the outcome of this decision. Patient Instructions Medicare Wellness Visit, Male The best way to live healthy is to have a lifestyle where you eat a well-balanced diet, exercise regularly, limit alcohol use, and quit all forms of tobacco/nicotine, if applicable. Regular preventive services are another way to keep healthy. Preventive services (vaccines, screening tests, monitoring & exams) can help personalize your care plan, which helps you manage your own care. Screening tests can find health problems at the earliest stages, when they are easiest to treat. [facility] follows the current, evidence-based guidelines published by the [facility] Milad A[facility] (USPSTF) when recommending preventive services for our patients. Because we follow these guidelines, sometimes recommendations change over time as research supports it. (For example, a prostate screening blood test is no longer routinely recommended for men with no symptoms.) Of course, you and your provider may decide to screen more often for some diseases, based on your risk and co-morbidities (chronic disease you are already diagnosed with). Preventive services for you include: - Medicare offers their members a free annual wellness visit, which is time for you and your primary care provider to discuss and plan for your preventive service needs. Take advantage of this benefit every year! 
 
-All people over age 72 should receive the recommended pneumonia vaccines. Current USPSTF guidelines recommend a series of two vaccines for the best pneumonia protection.  
 
-All adults should have a yearly flu vaccine and a tetanus vaccine every 10 years.  All adults age 61 years should receive a shingles vaccine once in their lifetime.   
 
-All adults age 38-68 years who are overweight should have a diabetes screening test once every three years.  
 
-Other screening tests & preventive services for persons with diabetes include: an eye exam to screen for diabetic retinopathy, a kidney function test, a foot exam, and stricter control over your cholesterol.  
 
-Cardiovascular screening for adults with routine risk involves an electrocardiogram (ECG) at intervals determined by the provider.  
 
-Colorectal cancer screenings should be done for adults age 54-65 years with normal risk. There are a number of acceptable methods of screening for this type of cancer. Each test has its own benefits and drawbacks. Discuss with your provider what is most appropriate for you during your annual wellness visit. The different tests include: colonoscopy (considered the best screening method), a fecal occult blood test, a fecal DNA test, and sigmoidoscopy. 
 
-All adults born between [facility] should be screened once for Hepatitis C. 
 
-An Abdominal Aortic Aneurysm (AAA) Screening is recommended for men age 73-68 who has ever smoked in their lifetime. Here is a list of your current Health Maintenance items (your personalized list of preventive services) with a due date: 
Health Maintenance Due Topic Date Due  Shingles Vaccine  08/16/2000  Glaucoma Screening   10/16/2005  Pneumococcal Vaccine (2 of 2 - PCV13) 10/30/2014 [address] [place] [address] Annual Well Visit  05/02/2018 Introducing [facility] & HE[facility]! [facility] introduces [employer] patient portal. Now you can access parts of your medical record, email your doctor's office, and request medication refills online. 1. In your internet browser, go to [email or web address] 2. Click on the First Time User? Click Here link in the Sign In box. You will see the New Member Sign Up page. 3. Enter your [employer] Access Code exactly as it appears below. You will not need to use this code after youve completed the sign-up process. If you do not sign up before the expiration date, you must request a new code. · [employer] Access Code: [facility] [place] Expires: 8/19/2018 11:34 AM 
 
4. Enter the last four digits of your Social Security Number (xxxx) and Date of Birth (mm/dd/yyyy) as indicated and click Submit. You will be taken to the next sign-up page. 5. Create a [employer] ID. This will be your [employer] login ID and cannot be changed, so think of one that is secure and easy to remember. 6. Create a [employer] password. You can change your password at any time. 7. Enter your Password Reset Question and Answer. This can be used at a later time if you forget your password. 8. Enter your e-mail address. You will receive e-mail notification when new information is available in [address] 9. Click Sign Up. You can now view and download portions of your medical record. 10. Click the Download Summary menu link to download a portable copy of your medical information. If you have questions, please visit the Frequently Asked Questions section of the [employer] website. Remember, [employer] is NOT to be used for urgent needs. For medical emergencies, dial 911. Now available from your iPhone and Android! Please provide this summary of care documentation to your next provider. Your primary care clinician is listed as Oli SANCHEZ. If you have any questions after today's visit, please call [phone].

## 2018-05-24 LAB
ALBUMIN SERPL-MCNC: 4.4 G/DL (ref 3.5–4.8)
ALBUMIN/GLOB SERPL: 1.7 {RATIO} (ref 1.2–2.2)
ALP SERPL-CCNC: 68 IU/L (ref 39–117)
ALT SERPL-CCNC: 14 IU/L (ref 0–44)
AST SERPL-CCNC: 17 IU/L (ref 0–40)
BILIRUB SERPL-MCNC: 0.6 MG/DL (ref 0–1.2)
BUN SERPL-MCNC: 16 MG/DL (ref 8–27)
BUN/CREAT SERPL: 17 (ref 10–24)
CALCIUM SERPL-MCNC: 9 MG/DL (ref 8.6–10.2)
CHLORIDE SERPL-SCNC: 102 MMOL/L (ref 96–106)
CHOLEST SERPL-MCNC: 149 MG/DL (ref 100–199)
CO2 SERPL-SCNC: 26 MMOL/L (ref 18–29)
CREAT SERPL-MCNC: 0.93 MG/DL (ref 0.76–1.27)
ERYTHROCYTE [DISTWIDTH] IN BLOOD BY AUTOMATED COUNT: 14 % (ref 12.3–15.4)
EST. AVERAGE GLUCOSE BLD GHB EST-MCNC: 126 MG/DL
GFR SERPLBLD CREATININE-BSD FMLA CKD-EPI: 79 ML/MIN/1.73
GFR SERPLBLD CREATININE-BSD FMLA CKD-EPI: 91 ML/MIN/1.73
GLOBULIN SER CALC-MCNC: 2.6 G/DL (ref 1.5–4.5)
GLUCOSE SERPL-MCNC: 107 MG/DL (ref 65–99)
HBA1C MFR BLD: 6 % (ref 4.8–5.6)
HCT VFR BLD AUTO: 43.3 % (ref 37.5–51)
HDLC SERPL-MCNC: 52 MG/DL
HGB BLD-MCNC: 14.5 G/DL (ref 13–17.7)
LDLC SERPL CALC-MCNC: 82 MG/DL (ref 0–99)
MCH RBC QN AUTO: 30.1 PG (ref 26.6–33)
MCHC RBC AUTO-ENTMCNC: 33.5 G/DL (ref 31.5–35.7)
MCV RBC AUTO: 90 FL (ref 79–97)
PLATELET # BLD AUTO: 203 X10E3/UL (ref 150–379)
POTASSIUM SERPL-SCNC: 4.8 MMOL/L (ref 3.5–5.2)
PROT SERPL-MCNC: 7 G/DL (ref 6–8.5)
RBC # BLD AUTO: 4.81 X10E6/UL (ref 4.14–5.8)
SODIUM SERPL-SCNC: 140 MMOL/L (ref 134–144)
TRIGL SERPL-MCNC: 76 MG/DL (ref 0–149)
VLDLC SERPL CALC-MCNC: 15 MG/DL (ref 5–40)
WBC # BLD AUTO: 5.1 X10E3/UL (ref 3.4–10.8)

## 2019-05-23 ENCOUNTER — OFFICE VISIT (OUTPATIENT)
Dept: INTERNAL MEDICINE CLINIC | Age: 79
End: 2019-05-23

## 2019-05-23 VITALS
SYSTOLIC BLOOD PRESSURE: 137 MMHG | DIASTOLIC BLOOD PRESSURE: 75 MMHG | OXYGEN SATURATION: 98 % | HEIGHT: 74 IN | RESPIRATION RATE: 16 BRPM | WEIGHT: 248 LBS | TEMPERATURE: 97.7 F | HEART RATE: 71 BPM | BODY MASS INDEX: 31.83 KG/M2

## 2019-05-23 DIAGNOSIS — Z85.46 HISTORY OF PROSTATE CANCER: ICD-10-CM

## 2019-05-23 DIAGNOSIS — R73.03 PREDIABETES: ICD-10-CM

## 2019-05-23 DIAGNOSIS — Z00.00 MEDICARE ANNUAL WELLNESS VISIT, SUBSEQUENT: ICD-10-CM

## 2019-05-23 DIAGNOSIS — I10 ESSENTIAL HYPERTENSION: Primary | ICD-10-CM

## 2019-05-23 RX ORDER — LOSARTAN POTASSIUM 100 MG/1
TABLET ORAL
Qty: 90 TAB | Refills: 3 | Status: SHIPPED | OUTPATIENT
Start: 2019-05-23 | End: 2020-05-25

## 2019-05-23 NOTE — PROGRESS NOTES
HISTORY OF PRESENT ILLNESS  Brad Lugo is a 66 y.o. male. HPI   Here for f/u HTN prediabetes and medicare wellness-------------------------  Feels well  Works on his farm about 5 hrs per day  Needs refill of losartan  Lazara Plascencia MD and eye MD regularly  No chest pain symptoms          last OV  Here in annual f/u htn prediabetes, hx prostate cancer and medicare wellness. 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  Donates blood q8 weks-140/80 bp at donation center  Weight down 11 lbs-- now, doing a lot of physical work            Patient Active Problem List    Diagnosis Date Noted    Advanced care planning/counseling discussion 11/04/2015    Prediabetes 10/30/2013    Essential hypertension, benign 04/29/2010    Obesity 04/29/2010    Prostate cancer ([facility] [address] 48/19/0469    Umbilical hernia 86/24/5876     Current Outpatient Medications   Medication Sig Dispense Refill    losartan (COZAAR) 100 mg tablet TAKE 1 TABLET BY MOUTH EVERY DAY 90 Tab 3    docusate sodium (COLACE) 100 mg capsule TAKE ONE CAPSULE BY MOUTH TWICE A DAY 30 Cap 11    ibuprofen (MOTRIN) 200 mg tablet Take 400 mg by mouth as needed for Pain. No Known Allergies  Social History     Tobacco Use    Smoking status: Never Smoker    Smokeless tobacco: Never Used    Tobacco comment: 3 months at 12years old/cigar use (occasionally 22 yrs old)   Substance Use Topics    Alcohol use:  Yes     Alcohol/week: 2.4 oz     Types: 4 Glasses of wine per week      Lab Results   Component Value Date/Time    WBC 5.1 05/23/2018 08:21 AM    HGB 14.5 05/23/2018 08:21 AM    HCT 43.3 05/23/2018 08:21 AM    PLATELET 084 16/99/3312 08:21 AM    MCV 90 05/23/2018 08:21 AM     Lab Results   Component Value Date/Time    Hemoglobin A1c 6.0 (H) 05/23/2018 08:21 AM    Hemoglobin A1c 5.8 (H) 10/28/2016 08:54 AM Hemoglobin A1c 6.2 (H) 11/04/2015 10:07 AM    Glucose 107 (H) 05/23/2018 08:21 AM    LDL, calculated 82 05/23/2018 08:21 AM    Creatinine 0.93 05/23/2018 08:21 AM      Lab Results   Component Value Date/Time    Cholesterol, total 149 05/23/2018 08:21 AM    HDL Cholesterol 52 05/23/2018 08:21 AM    LDL, calculated 82 05/23/2018 08:21 AM    Triglyceride 76 05/23/2018 08:21 AM     Lab Results   Component Value Date/Time    GFR est non-AA 79 05/23/2018 08:21 AM    GFR est AA 91 05/23/2018 08:21 AM    Creatinine 0.93 05/23/2018 08:21 AM    BUN 16 05/23/2018 08:21 AM    Sodium 140 05/23/2018 08:21 AM    Potassium 4.8 05/23/2018 08:21 AM    Chloride 102 05/23/2018 08:21 AM    CO2 26 05/23/2018 08:21 AM        ROS    Physical Exam   Constitutional: He appears well-developed and well-nourished. No distress. Appears stated age   HENT:   Head: Normocephalic. Mouth/Throat: Oropharynx is clear and moist.   Eyes: Pupils are equal, round, and reactive to light. Neck: Normal range of motion. Neck supple. No JVD present. No tracheal deviation present. No thyromegaly present. Cardiovascular: Normal rate, regular rhythm, normal heart sounds and intact distal pulses. Exam reveals no gallop and no friction rub. No murmur heard. Pulmonary/Chest: Effort normal and breath sounds normal. No respiratory distress. He has no wheezes. He has no rales. He exhibits no tenderness. Abdominal: Soft. He exhibits no distension and no mass. There is no tenderness. There is no rebound and no guarding. Musculoskeletal: He exhibits no edema. Lymphadenopathy:     He has no cervical adenopathy. Neurological: He is alert. Skin: Skin is warm. Psychiatric: He has a normal mood and affect. Nursing note and vitals reviewed. ASSESSMENT and PLAN  Diagnoses and all orders for this visit:    1. Essential hypertension  -     CBC W/O DIFF  -     METABOLIC PANEL, COMPREHENSIVE  -     LIPID PANEL   controlled  2.  Prediabetes  -     CBC W/O DIFF  -     METABOLIC PANEL, COMPREHENSIVE  -     LIPID PANEL  -     TSH 3RD GENERATION  -     HEMOGLOBIN A1C WITH EAG   Low calorie diet and weight reduction is recommended  3. History of prostate cancer  -     PSA, DIAGNOSTIC (PROSTATE SPECIFIC AG)    Other orders  -     losartan (COZAAR) 100 mg tablet; TAKE 1 TABLET BY MOUTH EVERY DAY           This is the Subsequent Medicare Annual Wellness Exam, performed 12 months or more after the Initial AWV or the last Subsequent AWV    I have reviewed the patient's medical history in detail and updated the computerized patient record. History     Past Medical History:   Diagnosis Date    Arthritis     Cancer [facility]) 2004    prostate    Hypertension       Past Surgical History:   Procedure Laterality Date    HX CATARACT REMOVAL      bilateral    HX COLONOSCOPY  10/29/2012    Repeat in 10 years    HX CYST REMOVAL      Hands and Pilonidal cyst removal (8 from spine)    HX HERNIA REPAIR  63/70/9139    Umbilical hernia repair    HX PROSTATECTOMY  2003     Current Outpatient Medications   Medication Sig Dispense Refill    losartan (COZAAR) 100 mg tablet TAKE 1 TABLET BY MOUTH EVERY DAY 90 Tab 3    ibuprofen (MOTRIN) 200 mg tablet Take 400 mg by mouth as needed for Pain.  docusate sodium (COLACE) 100 mg capsule TAKE ONE CAPSULE BY MOUTH TWICE A DAY 30 Cap 11     No Known Allergies  Family History   Problem Relation Age of Onset    High Cholesterol Mother     Stroke Father     Alcohol abuse Father     Other Father         tobacco abuse    No Known Problems Sister     Cancer Brother         Skin CA    Other Son         Herpes in the eyes (while in [employer])    Cancer Daughter 28        Ovarian CA     Social History     Tobacco Use    Smoking status: Never Smoker    Smokeless tobacco: Never Used    Tobacco comment: 3 months at 12years old/cigar use (occasionally 22 yrs old)   Substance Use Topics    Alcohol use:  Yes     Alcohol/week: 2.4 oz     Types: 4 Glasses of wine per week     Frequency: 2-3 times a week     Drinks per session: 1 or 2     Patient Active Problem List   Diagnosis Code    Essential hypertension, benign I10    Obesity E66.9    Prostate cancer ([facility] [address] K97    Umbilical hernia F20.8    Prediabetes R73.03    Advanced care planning/counseling discussion Z71.89       Depression Risk Factor Screening:     3 most recent PHQ Screens 5/23/2019   Little interest or pleasure in doing things Not at all   Feeling down, depressed, irritable, or hopeless Not at all   Total Score PHQ 2 0     Alcohol Risk Factor Screening: You do not drink alcohol or very rarely. Functional Ability and Level of Safety:   Hearing Loss  The patient needs further evaluation. Activities of Daily Living  The home contains: no safety equipment. Patient does total self care    Fall Risk  Fall Risk Assessment, last 12 mths 5/21/2018   Able to walk? Yes   Fall in past 12 months? No       Abuse Screen  Patient is not abused    Cognitive Screening   Evaluation of Cognitive Function:  Has your family/caregiver stated any concerns about your memory: no  Normal    Patient Care Team   Patient Care Team:  Hema Gore MD as PCP - Bria Santiago, RN as Nurse Rosemarie De Anda MD (General Surgery)  Tyrone Young MD (Ophthalmology)  Carlos Orozco DDS (Dental General Practice)  Horatio Osler, MD as Surgeon (General Surgery)    Assessment/Plan   Education and counseling provided:  Are appropriate based on today's review and evaluation  End-of-Life planning (with patient's consent)-advised pt to bring a copy of his living will  shingrix recommended    Diagnoses and all orders for this visit:    1. Essential hypertension  -     CBC W/O DIFF  -     METABOLIC PANEL, COMPREHENSIVE  -     LIPID PANEL   Good control   Refilled losartan  2.  Prediabetes  -     CBC W/O DIFF  -     METABOLIC PANEL, COMPREHENSIVE  -     LIPID PANEL  -     TSH 3RD GENERATION  - HEMOGLOBIN A1C WITH EAG   Low calorie diet and weight reduction recommended  3.  History of prostate cancer  -     PSA, DIAGNOSTIC (PROSTATE SPECIFIC AG)    Other orders  -     losartan (COZAAR) 100 mg tablet; TAKE 1 TABLET BY MOUTH EVERY DAY        Health Maintenance Due   Topic Date Due    Shingrix Vaccine Age 49> (1 of 2) 10/16/1990    GLAUCOMA SCREENING Q2Y  10/16/2005    MEDICARE YEARLY EXAM  05/22/2019

## 2019-05-23 NOTE — PROGRESS NOTES
Chief Complaint   Patient presents with   [facility] Annual Wellness Visit     Annual    Hypertension     routine    Labs     Fasting

## 2019-05-23 NOTE — PATIENT INSTRUCTIONS
Medicare Wellness Visit, Male The best way to live healthy is to have a lifestyle where you eat a well-balanced diet, exercise regularly, limit alcohol use, and quit all forms of tobacco/nicotine, if applicable. Regular preventive services are another way to keep healthy. Preventive services (vaccines, screening tests, monitoring & exams) can help personalize your care plan, which helps you manage your own care. Screening tests can find health problems at the earliest stages, when they are easiest to treat. [facility] follows the current, evidence-based guidelines published by the [facility] ([facility]) when recommending preventive services for our patients. Because we follow these guidelines, sometimes recommendations change over time as research supports it. (For example, a prostate screening blood test is no longer routinely recommended for men with no symptoms.) Of course, you and your doctor may decide to screen more often for some diseases, based on your risk and co-morbidities (chronic disease you are already diagnosed with). Preventive services for you include: - Medicare offers their members a free annual wellness visit, which is time for you and your primary care provider to discuss and plan for your preventive service needs. Take advantage of this benefit every year! 
-All adults over age 72 should receive the recommended pneumonia vaccines. Current USPSTF guidelines recommend a series of two vaccines for the best pneumonia protection.  
-All adults should have a flu vaccine yearly and an ECG.  All adults age 61 and older should receive a shingles vaccine once in their lifetime.   
-All adults age 38-68 who are overweight should have a diabetes screening test once every three years.  
-Other screening tests & preventive services for persons with diabetes include: an eye exam to screen for diabetic retinopathy, a kidney function test, a foot exam, and stricter control over your cholesterol.  
-Cardiovascular screening for adults with routine risk involves an electrocardiogram (ECG) at intervals determined by the provider.  
-Colorectal cancer screening should be done for adults age 54-65 with no increased risk factors for colorectal cancer. There are a number of acceptable methods of screening for this type of cancer. Each test has its own benefits and drawbacks. Discuss with your provider what is most appropriate for you during your annual wellness visit. The different tests include: colonoscopy (considered the best screening method), a fecal occult blood test, a fecal DNA test, and sigmoidoscopy. 
-All adults born between [facility] should be screened once for Hepatitis C. 
-An Abdominal Aortic Aneurysm (AAA) Screening is recommended for men age 73-68 who has ever smoked in their lifetime. Here is a list of your current Health Maintenance items (your personalized list of preventive services) with a due date: 
Health Maintenance Due Topic Date Due  Shingles Vaccine (1 of 2) 10/16/1990  Glaucoma Screening   10/16/2005 Jose Armando Annual Well Visit  05/22/2019

## 2019-05-24 LAB
ALBUMIN SERPL-MCNC: 4.2 G/DL (ref 3.5–4.8)
ALBUMIN/GLOB SERPL: 1.6 {RATIO} (ref 1.2–2.2)
ALP SERPL-CCNC: 57 IU/L (ref 39–117)
ALT SERPL-CCNC: 14 IU/L (ref 0–44)
AST SERPL-CCNC: 12 IU/L (ref 0–40)
BILIRUB SERPL-MCNC: 0.7 MG/DL (ref 0–1.2)
BUN SERPL-MCNC: 17 MG/DL (ref 8–27)
BUN/CREAT SERPL: 16 (ref 10–24)
CALCIUM SERPL-MCNC: 8.9 MG/DL (ref 8.6–10.2)
CHLORIDE SERPL-SCNC: 104 MMOL/L (ref 96–106)
CHOLEST SERPL-MCNC: 166 MG/DL (ref 100–199)
CO2 SERPL-SCNC: 24 MMOL/L (ref 20–29)
CREAT SERPL-MCNC: 1.04 MG/DL (ref 0.76–1.27)
ERYTHROCYTE [DISTWIDTH] IN BLOOD BY AUTOMATED COUNT: 15 % (ref 12.3–15.4)
EST. AVERAGE GLUCOSE BLD GHB EST-MCNC: 117 MG/DL
GLOBULIN SER CALC-MCNC: 2.6 G/DL (ref 1.5–4.5)
GLUCOSE SERPL-MCNC: 110 MG/DL (ref 65–99)
HBA1C MFR BLD: 5.7 % (ref 4.8–5.6)
HCT VFR BLD AUTO: 46.3 % (ref 37.5–51)
HDLC SERPL-MCNC: 60 MG/DL
HGB BLD-MCNC: 15.4 G/DL (ref 13–17.7)
LDLC SERPL CALC-MCNC: 90 MG/DL (ref 0–99)
MCH RBC QN AUTO: 30.3 PG (ref 26.6–33)
MCHC RBC AUTO-ENTMCNC: 33.3 G/DL (ref 31.5–35.7)
MCV RBC AUTO: 91 FL (ref 79–97)
PLATELET # BLD AUTO: 180 X10E3/UL (ref 150–450)
POTASSIUM SERPL-SCNC: 4.4 MMOL/L (ref 3.5–5.2)
PROT SERPL-MCNC: 6.8 G/DL (ref 6–8.5)
PSA SERPL-MCNC: <0.1 NG/ML (ref 0–4)
RBC # BLD AUTO: 5.09 X10E6/UL (ref 4.14–5.8)
SODIUM SERPL-SCNC: 140 MMOL/L (ref 134–144)
TRIGL SERPL-MCNC: 79 MG/DL (ref 0–149)
TSH SERPL DL<=0.005 MIU/L-ACNC: 2.06 UIU/ML (ref 0.45–4.5)
VLDLC SERPL CALC-MCNC: 16 MG/DL (ref 5–40)
WBC # BLD AUTO: 5 X10E3/UL (ref 3.4–10.8)

## 2020-05-25 RX ORDER — LOSARTAN POTASSIUM 100 MG/1
TABLET ORAL
Qty: 90 TAB | Refills: 1 | Status: SHIPPED | OUTPATIENT
Start: 2020-05-25 | End: 2020-06-05 | Stop reason: SDUPTHER

## 2020-06-05 ENCOUNTER — VIRTUAL VISIT (OUTPATIENT)
Dept: INTERNAL MEDICINE CLINIC | Age: 80
End: 2020-06-05

## 2020-06-05 VITALS
BODY MASS INDEX: 29.93 KG/M2 | HEART RATE: 62 BPM | SYSTOLIC BLOOD PRESSURE: 127 MMHG | DIASTOLIC BLOOD PRESSURE: 69 MMHG | WEIGHT: 230 LBS

## 2020-06-05 DIAGNOSIS — I10 ESSENTIAL HYPERTENSION: Primary | ICD-10-CM

## 2020-06-05 DIAGNOSIS — E66.9 OBESITY (BMI 30-39.9): ICD-10-CM

## 2020-06-05 DIAGNOSIS — C61 PROSTATE CANCER (HCC): ICD-10-CM

## 2020-06-05 DIAGNOSIS — R73.03 PREDIABETES: ICD-10-CM

## 2020-06-05 RX ORDER — LOSARTAN POTASSIUM 100 MG/1
TABLET ORAL
Qty: 90 TAB | Refills: 2 | Status: SHIPPED | OUTPATIENT
Start: 2020-06-05 | End: 2021-03-04 | Stop reason: SDUPTHER

## 2020-06-05 NOTE — PROGRESS NOTES
HISTORY OF PRESENT ILLNESS  Jhon Majano is a 78 y.o. male. HPI   VV via telephone encounter x 21 minutes d/t covid 19 pandemic  An electronic signature was used to authenticate this note. Here for f/u HTN prediabetes and medicare wellness-------------------------    Home BP -127/69 today per pt  Weight 230 lbs recently  Works on farm about 4 hrs per day  Last OV    Feels well  Works on his farm about 5 hrs per day  Needs refill of losartan  Beck Dye MD and eye MD regularly  No chest pain symptoms    Patient Active Problem List    Diagnosis Date Noted    Advanced care planning/counseling discussion 11/04/2015    Prediabetes 10/30/2013    Essential hypertension, benign 04/29/2010    Obesity 04/29/2010    Prostate cancer ([facility] [address] 51/50/1519    Umbilical hernia 63/95/6868     Current Outpatient Medications   Medication Sig Dispense Refill    losartan (COZAAR) 100 mg tablet TAKE 1 TABLET BY MOUTH EVERY DAY 90 Tab 1    docusate sodium (COLACE) 100 mg capsule TAKE ONE CAPSULE BY MOUTH TWICE A DAY 30 Cap 11    ibuprofen (MOTRIN) 200 mg tablet Take 400 mg by mouth as needed for Pain. No Known Allergies  Social History     Tobacco Use    Smoking status: Never Smoker    Smokeless tobacco: Never Used    Tobacco comment: 3 months at 12years old/cigar use (occasionally 22 yrs old)   Substance Use Topics    Alcohol use:  Yes     Alcohol/week: 4.0 standard drinks     Types: 4 Glasses of wine per week     Frequency: 2-3 times a week     Drinks per session: 1 or 2      Lab Results   Component Value Date/Time    WBC 5.0 05/23/2019 08:45 AM    HGB 15.4 05/23/2019 08:45 AM    HCT 46.3 05/23/2019 08:45 AM    PLATELET 379 34/40/8266 08:45 AM    MCV 91 05/23/2019 08:45 AM     Lab Results   Component Value Date/Time    Hemoglobin A1c 5.7 (H) 05/23/2019 08:45 AM    Hemoglobin A1c 6.0 (H) 05/23/2018 08:21 AM    Hemoglobin A1c 5.8 (H) 10/28/2016 08:54 AM    Glucose 110 (H) 05/23/2019 08:45 AM    LDL, calculated 90 05/23/2019 08:45 AM    Creatinine 1.04 05/23/2019 08:45 AM      Lab Results   Component Value Date/Time    Cholesterol, total 166 05/23/2019 08:45 AM    HDL Cholesterol 60 05/23/2019 08:45 AM    LDL, calculated 90 05/23/2019 08:45 AM    Triglyceride 79 05/23/2019 08:45 AM     Lab Results   Component Value Date/Time    GFR est non-AA 68 05/23/2019 08:45 AM    GFR est AA 79 05/23/2019 08:45 AM    Creatinine 1.04 05/23/2019 08:45 AM    BUN 17 05/23/2019 08:45 AM    Sodium 140 05/23/2019 08:45 AM    Potassium 4.4 05/23/2019 08:45 AM    Chloride 104 05/23/2019 08:45 AM    CO2 24 05/23/2019 08:45 AM     Lab Results   Component Value Date/Time    Prostate Specific Ag <0.1 05/23/2019 08:45 AM    Prostate Specific Ag <0.1 10/28/2016 08:54 AM    Prostate Specific Ag <0.1 11/04/2015 10:07 AM     Lab Results   Component Value Date/Time    TSH 2.060 05/23/2019 08:45 AM         ROS    Physical Exam    ASSESSMENT and PLAN  Diagnoses and all orders for this visit:    1. Essential hypertension  -     CBC W/O DIFF  -     METABOLIC PANEL, COMPREHENSIVE  -     LIPID PANEL  -     TSH 3RD GENERATION   Controlled per home readings  2. Prediabetes  -     HEMOGLOBIN A1C WITH EAG  -     TSH 3RD GENERATION   Advised weight reduction  3. Obesity (BMI 30-39. 9)   I have reviewed/discussed the above normal BMI with the patient. I have recommended the following interventions: dietary management education, guidance, and counseling and encourage exercise . [facility] Billing 4. Hx Prostate cancer (HCC)  -     PSA, DIAGNOSTIC (PROSTATE SPECIFIC AG)     Other orders  -     losartan (COZAAR) 100 mg tablet           This is the Subsequent Medicare Annual Wellness Exam, performed 12 months or more after the Initial AWV or the last Subsequent AWV    Consent: Reena Christiansen, who was seen by synchronous (real-time) audio only technology, and/or his healthcare decision maker, is aware that this patient-initiated, Telehealth encounter on 6/5/2020 is a billable service. While AWVs are fully covered by Medicare, any services rendered on this date that are not included in an AWV are subject to additional billing, with coverage as determined by his insurance carrier. He is aware that he may receive a bill for any such additional services and has provided verbal consent to proceed: Yes. I have reviewed the patient's medical history in detail and updated the computerized patient record. History     Patient Active Problem List   Diagnosis Code    Essential hypertension, benign I10    Obesity E66.9    Prostate cancer ([facility] [address] B45    Umbilical hernia J12.7    Prediabetes R73.03    Advanced care planning/counseling discussion Z71.89     Past Medical History:   Diagnosis Date    Arthritis     Cancer ([facility] [address] 2004    prostate    Hypertension       Past Surgical History:   Procedure Laterality Date    HX CATARACT REMOVAL      bilateral    HX COLONOSCOPY  10/29/2012    Repeat in 10 years    HX CYST REMOVAL      Hands and Pilonidal cyst removal (8 from spine)    HX HERNIA REPAIR  81/32/4086    Umbilical hernia repair    HX PROSTATECTOMY  2003     Current Outpatient Medications   Medication Sig Dispense Refill    losartan (COZAAR) 100 mg tablet TAKE 1 TABLET BY MOUTH EVERY DAY 90 Tab 1    ibuprofen (MOTRIN) 200 mg tablet Take 400 mg by mouth as needed for Pain.       docusate sodium (COLACE) 100 mg capsule TAKE ONE CAPSULE BY MOUTH TWICE A DAY 30 Cap 11     No Known Allergies    Family History   Problem Relation Age of Onset    High Cholesterol Mother     Stroke Father     Alcohol abuse Father     Other Father         tobacco abuse    No Known Problems Sister     Cancer Brother         Skin CA    Other Son         Herpes in the eyes (while in [employer])    Cancer Daughter 28        Ovarian CA     Social History     Tobacco Use    Smoking status: Never Smoker    Smokeless tobacco: Never Used    Tobacco comment: 3 months at 12years old/cigar use (occasionally 25 yrs old)   Substance Use Topics    Alcohol use: Yes     Alcohol/week: 4.0 standard drinks     Types: 4 Glasses of wine per week     Frequency: 2-3 times a week     Drinks per session: 1 or 2       Depression Risk Factor Screening:     3 most recent PHQ Screens 5/23/2019   Little interest or pleasure in doing things Not at all   Feeling down, depressed, irritable, or hopeless Not at all   Total Score PHQ 2 0       Alcohol Risk Factor Screening (MALE > 65): Do you average more 1 drink per night or more than 7 drinks a week: No    In the past three months have you have had more than 4 drinks containing alcohol on one occasion: No      Functional Ability and Level of Safety:   Hearing: Hearing is good. Activities of Daily Living: The home contains: handrails  Patient does total self care    Ambulation: with no difficulty    Fall Risk:  Fall Risk Assessment, last 12 mths 5/21/2018   Able to walk? Yes   Fall in past 12 months? No       Abuse Screen:  Patient is not abused    Cognitive Screening   Has your family/caregiver stated any concerns about your memory: no  Cognitive Screening: Normal - serial 3    Patient Care Team   Patient Care Team:  Patricia Puentes MD as PCP - General  Patricia Puentes MD as PCP - [facility]  Vanessa Henderson RN as Nurse Jeet Anderson MD (General Surgery)  Isaias Sheets MD (Ophthalmology)  Brandon Eller DDS (Dental General Practice)  Elbert Allen MD as Surgeon (General Surgery)    Assessment/Plan   Education and counseling provided:  Are appropriate based on today's review and evaluation  shingrix recommended    Diagnoses and all orders for this visit:    1. Essential hypertension  -     CBC W/O DIFF  -     METABOLIC PANEL, COMPREHENSIVE  -     LIPID PANEL  -     TSH 3RD GENERATION    2. Prediabetes  -     HEMOGLOBIN A1C WITH EAG  -     TSH 3RD GENERATION    3. Obesity (BMI 30-39.9)    4.  Prostate cancer ([facility] [address]  -     PSA, DIAGNOSTIC (PROSTATE SPECIFIC AG)    Other orders  -     losartan (COZAAR) 100 mg tablet        Health Maintenance Due   Topic Date Due    Shingrix Vaccine Age 49> (1 of 2) 10/16/1990    GLAUCOMA SCREENING Q2Y  10/16/2005    Medicare Yearly Exam  05/23/2020       Nehal Trujillo is a 78 y.o. male who was evaluated by an audio only encounter for concerns as above. Patient identification was verified prior to start of the visit. A caregiver was present when appropriate. Due to this being a TeleHealth encounter (During EMMS-42 public health emergency), evaluation of the following organ systems was limited: Vitals/Constitutional/EENT/Resp/CV/GI//MS/Neuro/Skin/Heme-Lymph-Imm. Pursuant to the emergency declaration under the [facility], 1135 waiver authority and the [employer] Act, this Virtual Visit was conducted, with patient's (and/or legal guardian's) consent, to reduce the patient's risk of exposure to COVID-19 and provide necessary medical care. Services were provided through a synchronous discussion virtually to substitute for in-person clinic visit. I was at home. The patient was at home.       Vickey Sacks, MD

## 2020-06-05 NOTE — PATIENT INSTRUCTIONS
Medicare Wellness Visit, Male The best way to live healthy is to have a lifestyle where you eat a well-balanced diet, exercise regularly, limit alcohol use, and quit all forms of tobacco/nicotine, if applicable. Regular preventive services are another way to keep healthy. Preventive services (vaccines, screening tests, monitoring & exams) can help personalize your care plan, which helps you manage your own care. Screening tests can find health problems at the earliest stages, when they are easiest to treat. [facility] follows the current, evidence-based guidelines published by the [facility] ([facility]) when recommending preventive services for our patients. Because we follow these guidelines, sometimes recommendations change over time as research supports it. (For example, a prostate screening blood test is no longer routinely recommended for men with no symptoms). Of course, you and your doctor may decide to screen more often for some diseases, based on your risk and co-morbidities (chronic disease you are already diagnosed with). Preventive services for you include: - Medicare offers their members a free annual wellness visit, which is time for you and your primary care provider to discuss and plan for your preventive service needs. Take advantage of this benefit every year! 
-All adults over age 72 should receive the recommended pneumonia vaccines. Current USPSTF guidelines recommend a series of two vaccines for the best pneumonia protection.  
-All adults should have a flu vaccine yearly and tetanus vaccine every 10 years. 
-All adults age 48 and older should receive the shingles vaccines (series of two vaccines).       
-All adults age 38-68 who are overweight should have a diabetes screening test once every three years.  
-Other screening tests & preventive services for persons with diabetes include: an eye exam to screen for diabetic retinopathy, a kidney function test, a foot exam, and stricter control over your cholesterol.  
-Cardiovascular screening for adults with routine risk involves an electrocardiogram (ECG) at intervals determined by the provider.  
-Colorectal cancer screening should be done for adults age 54-65 with no increased risk factors for colorectal cancer. There are a number of acceptable methods of screening for this type of cancer. Each test has its own benefits and drawbacks. Discuss with your provider what is most appropriate for you during your annual wellness visit. The different tests include: colonoscopy (considered the best screening method), a fecal occult blood test, a fecal DNA test, and sigmoidoscopy. 
-All adults born between [facility] should be screened once for Hepatitis C. 
-An Abdominal Aortic Aneurysm (AAA) Screening is recommended for men age 73-68 who has ever smoked in their lifetime. Here is a list of your current Health Maintenance items (your personalized list of preventive services) with a due date: 
Health Maintenance Due Topic Date Due  Shingles Vaccine (1 of 2) 10/16/1990  Glaucoma Screening   10/16/2005 [address] [place] [address] Annual Well Visit  05/23/2020

## 2020-06-18 LAB
ALBUMIN SERPL-MCNC: 4.1 G/DL (ref 3.7–4.7)
ALBUMIN/GLOB SERPL: 1.5 {RATIO} (ref 1.2–2.2)
ALP SERPL-CCNC: 55 IU/L (ref 39–117)
ALT SERPL-CCNC: 13 IU/L (ref 0–44)
AST SERPL-CCNC: 21 IU/L (ref 0–40)
BILIRUB SERPL-MCNC: 0.6 MG/DL (ref 0–1.2)
BUN SERPL-MCNC: 14 MG/DL (ref 8–27)
BUN/CREAT SERPL: 14 (ref 10–24)
CALCIUM SERPL-MCNC: 9 MG/DL (ref 8.6–10.2)
CHLORIDE SERPL-SCNC: 103 MMOL/L (ref 96–106)
CHOLEST SERPL-MCNC: 164 MG/DL (ref 100–199)
CO2 SERPL-SCNC: 24 MMOL/L (ref 20–29)
CREAT SERPL-MCNC: 0.99 MG/DL (ref 0.76–1.27)
ERYTHROCYTE [DISTWIDTH] IN BLOOD BY AUTOMATED COUNT: 13.8 % (ref 11.6–15.4)
EST. AVERAGE GLUCOSE BLD GHB EST-MCNC: 123 MG/DL
GLOBULIN SER CALC-MCNC: 2.7 G/DL (ref 1.5–4.5)
GLUCOSE SERPL-MCNC: 108 MG/DL (ref 65–99)
HBA1C MFR BLD: 5.9 % (ref 4.8–5.6)
HCT VFR BLD AUTO: 42.1 % (ref 37.5–51)
HDLC SERPL-MCNC: 64 MG/DL
HGB BLD-MCNC: 13.7 G/DL (ref 13–17.7)
LDLC SERPL CALC-MCNC: 89 MG/DL (ref 0–99)
MCH RBC QN AUTO: 29.8 PG (ref 26.6–33)
MCHC RBC AUTO-ENTMCNC: 32.5 G/DL (ref 31.5–35.7)
MCV RBC AUTO: 92 FL (ref 79–97)
PLATELET # BLD AUTO: 187 X10E3/UL (ref 150–450)
POTASSIUM SERPL-SCNC: 4.7 MMOL/L (ref 3.5–5.2)
PROT SERPL-MCNC: 6.8 G/DL (ref 6–8.5)
PSA SERPL-MCNC: <0.1 NG/ML (ref 0–4)
RBC # BLD AUTO: 4.59 X10E6/UL (ref 4.14–5.8)
SODIUM SERPL-SCNC: 139 MMOL/L (ref 134–144)
TRIGL SERPL-MCNC: 53 MG/DL (ref 0–149)
TSH SERPL DL<=0.005 MIU/L-ACNC: 1.41 UIU/ML (ref 0.45–4.5)
VLDLC SERPL CALC-MCNC: 11 MG/DL (ref 5–40)
WBC # BLD AUTO: 4.9 X10E3/UL (ref 3.4–10.8)

## 2021-03-04 RX ORDER — LOSARTAN POTASSIUM 100 MG/1
TABLET ORAL
Qty: 90 TAB | Refills: 2 | Status: SHIPPED | OUTPATIENT
Start: 2021-03-04 | End: 2022-03-02

## 2021-03-04 NOTE — TELEPHONE ENCOUNTER
Patient needs refill done thru [facility] indicated. Please call if any questions. Thank you    Patient reminded of [address] Turn around on refills.

## 2021-04-26 ENCOUNTER — TELEPHONE (OUTPATIENT)
Dept: INTERNAL MEDICINE CLINIC | Age: 81
End: 2021-04-26

## 2021-04-26 NOTE — TELEPHONE ENCOUNTER
Called patient, unable to reach patient at this time. No personal VM. Left generic message to return call to office at earliest convenience.

## 2021-04-26 NOTE — TELEPHONE ENCOUNTER
----- Message from Shamir Herr sent at 4/26/2021 12:46 PM EDT -----  Regarding: Dr. Ritika De Santiago  Patient return call    Caller's first and last name and relationship (if not the patient): pt       Best contact number(s): [phone]      Whose call is being returned: Yuliana Al      Details to clarify the request: about torn ligament on right thigh      Message from [facility]

## 2021-04-26 NOTE — TELEPHONE ENCOUNTER
Called patient. ID verified with Name and . Spoke with patient in regards to torn ligament. Patient states he believes he has torn his \"right quad\". Patient states that pain has been present for 6 days now, patient states he thought he would be able to alleviate pain on his own but has been unsuccessful and would like further evaluation. Informed patient about services provided by [facility]. Informed patient that no appt was needed and that he would be able to get in sooner to see someone there versus making an appt at this time. Provided patient with the information for [facility]. Patient states that he understands the information provided to him at this time. Patient had no further questions or concerns at this time.

## 2021-04-26 NOTE — TELEPHONE ENCOUNTER
----- Message from Alejandra Teresa sent at 4/26/2021 11:14 AM EDT -----  Regarding: Dr. Rian Ospina Message/Vendor Calls    Caller's first and last name: Pt       Reason for call: Thinks he has torn a ligament in his quad on right thigh, wants to know if he needs to see Dr. Alfreda Aguilera first or just go to an orthopedic doctor.       Callback required yes/no and why: yes, to discuss       Best contact number(s): [phone]      Message from [facility]

## 2021-07-08 NOTE — PROGRESS NOTES
HISTORY OF PRESENT ILLNESS  Ronnie Marx is a [de-identified] y.o. male. 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  bp highest 143/74 but usually 130/60-80  Feels well overall  Saw DERM Dr Lawanda Medina this week and had scalp lesion treatments    Last OV       Home BP -127/69 today per pt  Weight 230 lbs recently  Works on farm about 4 hrs per day  Last OV     Feels well  Works on his farm about 5 hrs per day  Needs refill of losartan  Kathi Berrios MD and eye MD regularly  No chest pain symptoms    Patient Active Problem List    Diagnosis Date Noted    Advanced care planning/counseling discussion 11/04/2015    Prediabetes 10/30/2013    Essential hypertension, benign 04/29/2010    Obesity 04/29/2010    [facility] 56/49[facility]1414    Umbilical hernia 18/57/5954     Current Outpatient Medications   Medication Sig Dispense Refill    losartan (COZAAR) 100 mg tablet One qd 90 Tab 2    docusate sodium (COLACE) 100 mg capsule TAKE ONE CAPSULE BY MOUTH TWICE A DAY 30 Cap 11    ibuprofen (MOTRIN) 200 mg tablet Take 400 mg by mouth as needed for Pain. No Known Allergies  Social History     Tobacco Use    Smoking status: Never Smoker    Smokeless tobacco: Never Used    Tobacco comment: 3 months at 12years old/cigar use (occasionally 22 yrs old)   Substance Use Topics    Alcohol use:  Yes     Alcohol/week: 4.0 standard drinks     Types: 4 Glasses of wine per week      Lab Results   Component Value Date/Time    WBC 4.9 06/17/2020 10:04 AM    HGB 13.7 06/17/2020 10:04 AM    HCT 42.1 06/17/2020 10:04 AM    PLATELET 264 29/12/4983 10:04 AM    MCV 92 06/17/2020 10:04 AM     Lab Results   Component Value Date/Time    Hemoglobin A1c 5.9 (H) 06/17/2020 10:04 AM    Hemoglobin A1c 5.7 (H) 05/23/2019 08:45 AM    Hemoglobin A1c 6.0 (H) 05/23/2018 08:21 AM    Glucose 108 (H) 06/17/2020 10:04 AM    LDL, calculated 89 06/17/2020 10:04 AM    Creatinine 0.99 06/17/2020 10:04 AM      Lab Results   Component Value Date/Time    Cholesterol, total 164 06/17/2020 10:04 AM    HDL Cholesterol 64 06/17/2020 10:04 AM    LDL, calculated 89 06/17/2020 10:04 AM    Triglyceride 53 06/17/2020 10:04 AM     Lab Results   Component Value Date/Time    GFR est non-AA 72 06/17/2020 10:04 AM    GFR est AA 83 06/17/2020 10:04 AM    Creatinine 0.99 06/17/2020 10:04 AM    BUN 14 06/17/2020 10:04 AM    Sodium 139 06/17/2020 10:04 AM    Potassium 4.7 06/17/2020 10:04 AM    Chloride 103 06/17/2020 10:04 AM    CO2 24 06/17/2020 10:04 AM        Review of Systems   Constitutional: Negative for chills, fever, malaise/fatigue and weight loss. Eyes: Negative for blurred vision and double vision. Respiratory: Negative for cough and shortness of breath. Cardiovascular: Negative for chest pain and palpitations. Gastrointestinal: Negative for abdominal pain, blood in stool, constipation, diarrhea, melena, nausea and vomiting. Genitourinary: Negative for dysuria, frequency, hematuria and urgency. Musculoskeletal: Negative for back pain, falls, joint pain and myalgias. Neurological: Negative for dizziness, tremors and headaches. Physical Exam  Vitals and nursing note reviewed. Constitutional:       General: He is not in acute distress. Appearance: He is well-developed. Comments: Appears stated age   HENT:      Head: Normocephalic. Right Ear: Tympanic membrane normal.      Left Ear: Tympanic membrane normal.      Nose: Nose normal.   Cardiovascular:      Rate and Rhythm: Normal rate and regular rhythm. Heart sounds: Normal heart sounds. Pulmonary:      Effort: Pulmonary effort is normal.      Breath sounds: Normal breath sounds. Abdominal:      Palpations: Abdomen is soft. Musculoskeletal:      Cervical back: Normal range of motion. Skin:     General: Skin is warm.    Neurological:      General: No focal deficit present. Mental Status: He is alert. Psychiatric:         Mood and Affect: Mood normal.         Thought Content: Thought content normal.         Judgment: Judgment normal.         ASSESSMENT and PLAN  Diagnoses and all orders for this visit:    1. Essential hypertension  -     CBC W/O DIFF; Future  -     METABOLIC PANEL, COMPREHENSIVE; Future  -     LIPID PANEL; Future    2. Prediabetes  -     HEMOGLOBIN A1C WITH EAG; Future  -     TSH 3RD GENERATION; Future    3. Family hx of prostate cancer    4. [facility]  -     PSA, DIAGNOSTIC (PROSTATE SPECIFIC AG); Future           This is the Subsequent Medicare Annual Wellness Exam, performed 12 months or more after the Initial AWV or the last Subsequent AWV    I have reviewed the patient's medical history in detail and updated the computerized patient record. Assessment/Plan   Education and counseling provided:  Are appropriate based on today's review and evaluation  End-of-Life planning (with patient's consent)  Prostate cancer screening tests (PSA, covered annually)    1. Essential hypertension  -     CBC W/O DIFF; Future  -     METABOLIC PANEL, COMPREHENSIVE; Future  -     LIPID PANEL; Future   bp elevated   Pt will records readings and fu in 1 month   Declines addition of 2nd medication today. Low sodium diet recommened  2. Prediabetes  -     HEMOGLOBIN A1C WITH EAG; Future  -     TSH 3RD GENERATION; Future    3. [facility] cancer ([facility]  -     PSA, DIAGNOSTIC (PROSTATE SPECIFIC AG); Future  4. Obesity   I have reviewed/discussed the above normal BMI with the patient. I have recommended the following interventions: dietary management education, guidance, and counseling and encourage exercise . Clement Fly       Depression Risk Factor Screening     3 most recent PHQ Screens 7/9/2021   Little interest or pleasure in doing things Not at all   Feeling down, depressed, irritable, or hopeless Not at all   Total Score PHQ 2 0       Alcohol Risk Screen    Do you average more than 1 drink per night or more than 7 drinks a week: No    In the past three months have you have had more than 4 drinks containing alcohol on one occasion: No        Functional Ability and Level of Safety    Hearing: Hearing is good. Activities of Daily Living: The home contains: handrails  Patient does total self care      Ambulation: with no difficulty     Fall Risk:  Fall Risk Assessment, last 12 mths 7/9/2021   Able to walk? Yes   Fall in past 12 months? 0   Do you feel unsteady?  0   Are you worried about falling 0      Abuse Screen:  Patient is not abused       Cognitive Screening    Has your family/caregiver stated any concerns about your memory: no     Cognitive Screening: Normal - serial 3    Health Maintenance Due     Health Maintenance Due   Topic Date Due    Medicare Yearly Exam  06/06/2021       Patient Care Team   Patient Care Team:  Bobby Levin MD as PCP - General  Bobby Levin MD as PCP - [address] [place] [address]  [facility] [place] [facility]  Anand Muro MD (General Surgery)  Deana Cortez MD (Ophthalmology)  Kimberly Arriola DDS (Dental General Practice)  Katie Dorman MD as Surgeon (General Surgery)    History     Patient Active Problem List   Diagnosis Code    Essential hypertension, benign I10    Obesity E66.9    Prostate cancer [facility]) A04    Umbilical hernia V86.6    Prediabetes R73.03    Advanced care planning/counseling discussion Z71.89     Past Medical History:   Diagnosis Date    Arthritis     Cancer [facility]) 2004    prostate    Hypertension       Past Surgical History:   Procedure Laterality Date    HX CATARACT REMOVAL      bilateral    HX COLONOSCOPY  10/29/2012    Repeat in 10 years    HX CYST REMOVAL      Hands and Pilonidal cyst removal (8 from spine)    HX HERNIA REPAIR  53/79/2452    Umbilical hernia repair    HX PROSTATECTOMY  2003     Current Outpatient Medications   Medication Sig Dispense Refill    losartan (COZAAR) 100 mg tablet One qd 90 Tab 2    docusate sodium (COLACE) 100 mg capsule TAKE ONE CAPSULE BY MOUTH TWICE A DAY 30 Cap 11    ibuprofen (MOTRIN) 200 mg tablet Take 400 mg by mouth as needed for Pain. No Known Allergies    Family History   Problem Relation Age of Onset    High Cholesterol Mother     Stroke Father     Alcohol abuse Father     Other Father         tobacco abuse    No Known Problems Sister     Cancer Brother         Skin CA    Other Son         Herpes in the eyes (while in [employer])    Cancer Daughter 29        Ovarian CA     Social History     Tobacco Use    Smoking status: Never Smoker    Smokeless tobacco: Never Used    Tobacco comment: 3 months at 12years old/cigar use (occasionally 22 yrs old)   Substance Use Topics    Alcohol use:  Yes     Alcohol/week: 4.0 standard drinks     Types: 4 Glasses of wine per week         Ean Reid MD

## 2021-07-09 ENCOUNTER — OFFICE VISIT (OUTPATIENT)
Dept: INTERNAL MEDICINE CLINIC | Age: 81
End: 2021-07-09
Payer: MEDICARE

## 2021-07-09 VITALS
HEIGHT: 73 IN | DIASTOLIC BLOOD PRESSURE: 84 MMHG | OXYGEN SATURATION: 98 % | TEMPERATURE: 99.5 F | SYSTOLIC BLOOD PRESSURE: 160 MMHG | RESPIRATION RATE: 16 BRPM | HEART RATE: 61 BPM | BODY MASS INDEX: 32.34 KG/M2 | WEIGHT: 244 LBS

## 2021-07-09 DIAGNOSIS — C61 PROSTATE CANCER (HCC): ICD-10-CM

## 2021-07-09 DIAGNOSIS — E66.9 OBESITY (BMI 30.0-34.9): ICD-10-CM

## 2021-07-09 DIAGNOSIS — I10 ESSENTIAL HYPERTENSION: Primary | ICD-10-CM

## 2021-07-09 DIAGNOSIS — R73.03 PREDIABETES: ICD-10-CM

## 2021-07-09 DIAGNOSIS — Z00.00 MEDICARE ANNUAL WELLNESS VISIT, SUBSEQUENT: ICD-10-CM

## 2021-07-09 LAB
ALBUMIN SERPL-MCNC: 3.9 G/DL (ref 3.5–5)
ALBUMIN/GLOB SERPL: 1.2 {RATIO} (ref 1.1–2.2)
ALP SERPL-CCNC: 59 U/L (ref 45–117)
ALT SERPL-CCNC: 16 U/L (ref 12–78)
ANION GAP SERPL CALC-SCNC: 5 MMOL/L (ref 5–15)
AST SERPL-CCNC: 14 U/L (ref 15–37)
BILIRUB SERPL-MCNC: 0.9 MG/DL (ref 0.2–1)
BUN SERPL-MCNC: 18 MG/DL (ref 6–20)
BUN/CREAT SERPL: 21 (ref 12–20)
CALCIUM SERPL-MCNC: 8.7 MG/DL (ref 8.5–10.1)
CHLORIDE SERPL-SCNC: 107 MMOL/L (ref 97–108)
CHOLEST SERPL-MCNC: 172 MG/DL
CO2 SERPL-SCNC: 28 MMOL/L (ref 21–32)
CREAT SERPL-MCNC: 0.86 MG/DL (ref 0.7–1.3)
ERYTHROCYTE [DISTWIDTH] IN BLOOD BY AUTOMATED COUNT: 14.3 % (ref 11.5–14.5)
EST. AVERAGE GLUCOSE BLD GHB EST-MCNC: 120 MG/DL
GLOBULIN SER CALC-MCNC: 3.2 G/DL (ref 2–4)
GLUCOSE SERPL-MCNC: 99 MG/DL (ref 65–100)
HBA1C MFR BLD: 5.8 % (ref 4–5.6)
HCT VFR BLD AUTO: 41.6 % (ref 36.6–50.3)
HDLC SERPL-MCNC: 73 MG/DL
HDLC SERPL: 2.4 {RATIO} (ref 0–5)
HGB BLD-MCNC: 13.2 G/DL (ref 12.1–17)
LDLC SERPL CALC-MCNC: 87 MG/DL (ref 0–100)
MCH RBC QN AUTO: 30.4 PG (ref 26–34)
MCHC RBC AUTO-ENTMCNC: 31.7 G/DL (ref 30–36.5)
MCV RBC AUTO: 95.9 FL (ref 80–99)
NRBC # BLD: 0 K/UL (ref 0–0.01)
NRBC BLD-RTO: 0 PER 100 WBC
PLATELET # BLD AUTO: 178 K/UL (ref 150–400)
PMV BLD AUTO: 12.4 FL (ref 8.9–12.9)
POTASSIUM SERPL-SCNC: 4.3 MMOL/L (ref 3.5–5.1)
PROT SERPL-MCNC: 7.1 G/DL (ref 6.4–8.2)
PSA SERPL-MCNC: 0 NG/ML (ref 0.01–4)
RBC # BLD AUTO: 4.34 M/UL (ref 4.1–5.7)
SODIUM SERPL-SCNC: 140 MMOL/L (ref 136–145)
TRIGL SERPL-MCNC: 60 MG/DL (ref ?–150)
TSH SERPL DL<=0.05 MIU/L-ACNC: 1.1 UIU/ML (ref 0.36–3.74)
VLDLC SERPL CALC-MCNC: 12 MG/DL
WBC # BLD AUTO: 5 K/UL (ref 4.1–11.1)

## 2021-07-09 PROCEDURE — 1101F PT FALLS ASSESS-DOCD LE1/YR: CPT | Performed by: INTERNAL MEDICINE

## 2021-07-09 PROCEDURE — G8427 DOCREV CUR MEDS BY ELIG CLIN: HCPCS | Performed by: INTERNAL MEDICINE

## 2021-07-09 PROCEDURE — G0439 PPPS, SUBSEQ VISIT: HCPCS | Performed by: INTERNAL MEDICINE

## 2021-07-09 PROCEDURE — 99213 OFFICE O/P EST LOW 20 MIN: CPT | Performed by: INTERNAL MEDICINE

## 2021-07-09 PROCEDURE — G8754 DIAS BP LESS 90: HCPCS | Performed by: INTERNAL MEDICINE

## 2021-07-09 PROCEDURE — G8753 SYS BP > OR = 140: HCPCS | Performed by: INTERNAL MEDICINE

## 2021-07-09 PROCEDURE — G8510 SCR DEP NEG, NO PLAN REQD: HCPCS | Performed by: INTERNAL MEDICINE

## 2021-07-09 PROCEDURE — G8417 CALC BMI ABV UP PARAM F/U: HCPCS | Performed by: INTERNAL MEDICINE

## 2021-07-09 PROCEDURE — G8536 NO DOC ELDER MAL SCRN: HCPCS | Performed by: INTERNAL MEDICINE

## 2021-07-09 NOTE — PATIENT INSTRUCTIONS
Office Policies    Phone calls/patient messages:            Please allow up to 24 hours for someone in the office to contact you about your call or message. Be mindful your provider may be out of the office or your message may require further review. We encourage you to use [employer] for your messages as this is a faster, more efficient way to communicate with our office                         Medication Refills:            Prescription medications require 48-72 business hours to process. We encourage you to use [employer] for your refills. For controlled medications: Please allow 72 business hours to process. Certain medications may require you to  a written prescription at our office. NO narcotic/controlled medications will be prescribed after 4pm Monday through Friday or on weekends              Form/Paperwork Completion:            Please note a $25 fee may incur for all paperwork for completed by our providers. We ask that you allow 7-10 business days. Pre-payment is due prior to picking up/faxing the completed form. You may also download your forms to [employer] to have your doctor print off. Medicare Wellness Visit, Male    The best way to live healthy is to have a lifestyle where you eat a well-balanced diet, exercise regularly, limit alcohol use, and quit all forms of tobacco/nicotine, if applicable. Regular preventive services are another way to keep healthy. Preventive services (vaccines, screening tests, monitoring & exams) can help personalize your care plan, which helps you manage your own care. Screening tests can find health problems at the earliest stages, when they are easiest to treat. [facility] follows the current, evidence-based guidelines published by the [facility] and [employer] (USPSTF) when recommending preventive services for our patients.  Because we follow these guidelines, sometimes recommendations change over time as research supports it. (For example, a prostate screening blood test is no longer routinely recommended for men with no symptoms). Of course, you and your doctor may decide to screen more often for some diseases, based on your risk and co-morbidities (chronic disease you are already diagnosed with). Preventive services for you include:  - Medicare offers their members a free annual wellness visit, which is time for you and your primary care provider to discuss and plan for your preventive service needs. Take advantage of this benefit every year!  -All adults over age 72 should receive the recommended pneumonia vaccines. Current USPSTF guidelines recommend a series of two vaccines for the best pneumonia protection.   -All adults should have a flu vaccine yearly and tetanus vaccine every 10 years.  -All adults age 48 and older should receive the shingles vaccines (series of two vaccines). -All adults age 38-68 who are overweight should have a diabetes screening test once every three years.   -Other screening tests & preventive services for persons with diabetes include: an eye exam to screen for diabetic retinopathy, a kidney function test, a foot exam, and stricter control over your cholesterol.   -Cardiovascular screening for adults with routine risk involves an electrocardiogram (ECG) at intervals determined by the provider.   -Colorectal cancer screening should be done for adults age 54-65 with no increased risk factors for colorectal cancer. There are a number of acceptable methods of screening for this type of cancer. Each test has its own benefits and drawbacks. Discuss with your provider what is most appropriate for you during your annual wellness visit.  The different tests include: colonoscopy (considered the best screening method), a fecal occult blood test, a fecal DNA test, and sigmoidoscopy.  -All adults born between [facility] should be screened once for Hepatitis C.  -An Abdominal Aortic Aneurysm (AAA) Screening is recommended for men age 73-68 who has ever smoked in their lifetime.      Here is a list of your current Health Maintenance items (your personalized list of preventive services) with a due date:  Health Maintenance Due   Topic Date Due    Annual Well Visit  06/06/2021 I will SWITCH the dose or number of times a day I take the medications listed below when I get home from the hospital:  None

## 2021-07-19 RX ORDER — AMLODIPINE BESYLATE 5 MG/1
5 TABLET ORAL DAILY
Qty: 90 TABLET | Refills: 1 | Status: SHIPPED | OUTPATIENT
Start: 2021-07-19 | End: 2021-08-24 | Stop reason: SDUPTHER

## 2021-08-24 ENCOUNTER — OFFICE VISIT (OUTPATIENT)
Dept: INTERNAL MEDICINE CLINIC | Age: 81
End: 2021-08-24
Payer: MEDICARE

## 2021-08-24 VITALS
HEART RATE: 66 BPM | OXYGEN SATURATION: 98 % | BODY MASS INDEX: 31.81 KG/M2 | SYSTOLIC BLOOD PRESSURE: 122 MMHG | TEMPERATURE: 97.9 F | DIASTOLIC BLOOD PRESSURE: 68 MMHG | RESPIRATION RATE: 16 BRPM | WEIGHT: 240 LBS | HEIGHT: 73 IN

## 2021-08-24 DIAGNOSIS — I10 ESSENTIAL HYPERTENSION: Primary | ICD-10-CM

## 2021-08-24 PROCEDURE — G8752 SYS BP LESS 140: HCPCS | Performed by: INTERNAL MEDICINE

## 2021-08-24 PROCEDURE — G8417 CALC BMI ABV UP PARAM F/U: HCPCS | Performed by: INTERNAL MEDICINE

## 2021-08-24 PROCEDURE — G8536 NO DOC ELDER MAL SCRN: HCPCS | Performed by: INTERNAL MEDICINE

## 2021-08-24 PROCEDURE — G8754 DIAS BP LESS 90: HCPCS | Performed by: INTERNAL MEDICINE

## 2021-08-24 PROCEDURE — 99213 OFFICE O/P EST LOW 20 MIN: CPT | Performed by: INTERNAL MEDICINE

## 2021-08-24 PROCEDURE — G8432 DEP SCR NOT DOC, RNG: HCPCS | Performed by: INTERNAL MEDICINE

## 2021-08-24 PROCEDURE — G8427 DOCREV CUR MEDS BY ELIG CLIN: HCPCS | Performed by: INTERNAL MEDICINE

## 2021-08-24 PROCEDURE — 1101F PT FALLS ASSESS-DOCD LE1/YR: CPT | Performed by: INTERNAL MEDICINE

## 2021-08-24 RX ORDER — AMLODIPINE BESYLATE 5 MG/1
5 TABLET ORAL DAILY
Qty: 90 TABLET | Refills: 3 | Status: SHIPPED | OUTPATIENT
Start: 2021-08-24 | End: 2022-09-26 | Stop reason: SDUPTHER

## 2021-08-24 NOTE — TELEPHONE ENCOUNTER
Future Appointments:  No future appointments. Last Appointment With Me:  8/24/2021     Requested Prescriptions     Pending Prescriptions Disp Refills    amLODIPine (NORVASC) 5 mg tablet 90 Tablet 3     Sig: Take 1 Tablet by mouth daily.

## 2021-08-24 NOTE — PROGRESS NOTES
HISTORY OF PRESENT ILLNESS  Lin Barnes is a [de-identified] y.o. male. HPI     Fu HTN--amlodipine 5 mg every day was added after last  OV for elevated BP   No side effects on new med  Still on losartan  Weight down 4 lbs     Patient Active Problem List    Diagnosis Date Noted    Advanced care planning/counseling discussion 11/04/2015    Prediabetes 10/30/2013    Essential hypertension, benign 04/29/2010    Obesity 04/29/2010    Prostate cancer ([facility] 15/79/7959    Umbilical hernia 30/16/0775     Current Outpatient Medications   Medication Sig Dispense Refill    losartan (COZAAR) 100 mg tablet One qd 90 Tab 2    amLODIPine (NORVASC) 5 mg tablet Take 1 Tablet by mouth daily. 90 Tablet 3    ibuprofen (MOTRIN) 200 mg tablet Take 400 mg by mouth as needed for Pain. (Patient not taking: Reported on 8/24/2021)       No Known Allergies   Lab Results   Component Value Date/Time    WBC 5.0 07/09/2021 11:25 AM    HGB 13.2 07/09/2021 11:25 AM    HCT 41.6 07/09/2021 11:25 AM    PLATELET 824 60/65/8881 11:25 AM    MCV 95.9 07/09/2021 11:25 AM     Lab Results   Component Value Date/Time    Cholesterol, total 172 07/09/2021 11:25 AM    HDL Cholesterol 73 07/09/2021 11:25 AM    LDL, calculated 87 07/09/2021 11:25 AM    Triglyceride 60 07/09/2021 11:25 AM    CHOL/HDL Ratio 2.4 07/09/2021 11:25 AM     Lab Results   Component Value Date/Time    GFR est non-AA >60 07/09/2021 11:25 AM    GFR est AA >60 07/09/2021 11:25 AM    Creatinine 0.86 07/09/2021 11:25 AM    BUN 18 07/09/2021 11:25 AM    Sodium 140 07/09/2021 11:25 AM    Potassium 4.3 07/09/2021 11:25 AM    Chloride 107 07/09/2021 11:25 AM    CO2 28 07/09/2021 11:25 AM        ROS    Physical Exam  Vitals and nursing note reviewed. Constitutional:       General: He is not in acute distress. Appearance: He is well-developed. He is obese. Comments: Appears stated age   HENT:      Head: Normocephalic. Cardiovascular:      Rate and Rhythm: Normal rate and regular rhythm. Heart sounds: Normal heart sounds. Pulmonary:      Effort: Pulmonary effort is normal.      Breath sounds: Normal breath sounds. Abdominal:      Palpations: Abdomen is soft. Neurological:      Mental Status: He is alert. ASSESSMENT and PLAN  Diagnoses and all orders for this visit:    1. Essential hypertension   Controlled now   bp monitoring   Continue current medicines    Follow-up and Dispositions    · Return in about 6 months (around 2/24/2022) for 1 year CPE.

## 2021-08-24 NOTE — PATIENT INSTRUCTIONS
Office Policies    Phone calls/patient messages:            Please allow up to 24 hours for someone in the office to contact you about your call or message. Be mindful your provider may be out of the office or your message may require further review. We encourage you to use [employer] for your messages as this is a faster, more efficient way to communicate with our office                         Medication Refills:            Prescription medications require 48-72 business hours to process. We encourage you to use [employer] for your refills. For controlled medications: Please allow 72 business hours to process. Certain medications may require you to  a written prescription at our office. NO narcotic/controlled medications will be prescribed after 4pm Monday through Friday or on weekends              Form/Paperwork Completion:            Please note a $25 fee may incur for all paperwork for completed by our providers. We ask that you allow 7-10 business days. Pre-payment is due prior to picking up/faxing the completed form. You may also download your forms to [employer] to have your doctor print off.

## 2022-02-23 ENCOUNTER — OFFICE VISIT (OUTPATIENT)
Dept: INTERNAL MEDICINE CLINIC | Age: 82
End: 2022-02-23

## 2022-02-23 VITALS
SYSTOLIC BLOOD PRESSURE: 138 MMHG | HEIGHT: 72 IN | WEIGHT: 244 LBS | HEART RATE: 69 BPM | DIASTOLIC BLOOD PRESSURE: 70 MMHG | OXYGEN SATURATION: 98 % | TEMPERATURE: 97.5 F | BODY MASS INDEX: 33.05 KG/M2 | RESPIRATION RATE: 16 BRPM

## 2022-02-23 DIAGNOSIS — I10 HYPERTENSION, UNSPECIFIED TYPE: Primary | ICD-10-CM

## 2022-02-23 DIAGNOSIS — L57.0 AK (ACTINIC KERATOSIS): ICD-10-CM

## 2022-02-23 DIAGNOSIS — C61 PROSTATE CANCER (HCC): ICD-10-CM

## 2022-02-23 DIAGNOSIS — R73.03 PREDIABETES: ICD-10-CM

## 2022-02-23 PROCEDURE — G8536 NO DOC ELDER MAL SCRN: HCPCS | Performed by: INTERNAL MEDICINE

## 2022-02-23 PROCEDURE — G8417 CALC BMI ABV UP PARAM F/U: HCPCS | Performed by: INTERNAL MEDICINE

## 2022-02-23 PROCEDURE — 99213 OFFICE O/P EST LOW 20 MIN: CPT | Performed by: INTERNAL MEDICINE

## 2022-02-23 PROCEDURE — G8432 DEP SCR NOT DOC, RNG: HCPCS | Performed by: INTERNAL MEDICINE

## 2022-02-23 PROCEDURE — G8754 DIAS BP LESS 90: HCPCS | Performed by: INTERNAL MEDICINE

## 2022-02-23 PROCEDURE — G8427 DOCREV CUR MEDS BY ELIG CLIN: HCPCS | Performed by: INTERNAL MEDICINE

## 2022-02-23 PROCEDURE — 1101F PT FALLS ASSESS-DOCD LE1/YR: CPT | Performed by: INTERNAL MEDICINE

## 2022-02-23 PROCEDURE — G8752 SYS BP LESS 140: HCPCS | Performed by: INTERNAL MEDICINE

## 2022-02-23 NOTE — PROGRESS NOTES
HISTORY OF PRESENT ILLNESS  Pratibha Bonner is a 80 y.o. male. 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 90 Tablet 3    losartan (COZAAR) 100 mg tablet One qd 90 Tab 2    ibuprofen (MOTRIN) 200 mg tablet Take 400 mg by mouth as needed for Pain.  (Patient not taking: Reported on 8/24/2021)       No Known Allergies   Lab Results   Component Value Date/Time    WBC 5.0 07/09/2021 11:25 AM    HGB 13.2 07/09/2021 11:25 AM    HCT 41.6 07/09/2021 11:25 AM    PLATELET 799 15/06/7023 11:25 AM    MCV 95.9 07/09/2021 11:25 AM     Lab Results   Component Value Date/Time    Hemoglobin A1c 5.8 (H) 07/09/2021 11:25 AM    Hemoglobin A1c 5.9 (H) 06/17/2020 10:04 AM    Hemoglobin A1c 5.7 (H) 05/23/2019 08:45 AM    Glucose 99 07/09/2021 11:25 AM    LDL, calculated 87 07/09/2021 11:25 AM    Creatinine 0.86 07/09/2021 11:25 AM      Lab Results   Component Value Date/Time    Cholesterol, total 172 07/09/2021 11:25 AM    HDL Cholesterol 73 07/09/2021 11:25 AM    LDL, calculated 87 07/09/2021 11:25 AM    Triglyceride 60 07/09/2021 11:25 AM    CHOL/HDL Ratio 2.4 07/09/2021 11:25 AM     Lab Results   Component Value Date/Time    GFR est non-AA >60 07/09/2021 11:25 AM    GFR est AA >60 07/09/2021 11:25 AM    Creatinine 0.86 07/09/2021 11:25 AM    BUN 18 07/09/2021 11:25 AM    Sodium 140 07/09/2021 11:25 AM    Potassium 4.3 07/09/2021 11:25 AM    Chloride 107 07/09/2021 11:25 AM    CO2 28 07/09/2021 11:25 AM     Lab Results   Component Value Date/Time    Prostate Specific Ag 0.0 (L) 07/09/2021 11:25 AM    Prostate Specific Ag <0.1 06/17/2020 10:04 AM    Prostate Specific Ag <0.1 05/23/2019 08:45 AM    Prostate Specific Ag <0.1 10/28/2016 08:54 AM     Lab Results   Component Value Date/Time    TSH 1.10 07/09/2021 11:25 AM         ROS    Physical Exam  Vitals and nursing note reviewed. Constitutional:       General: He is not in acute distress. Appearance: Normal appearance. He is well-developed. He is obese. Comments: Appears stated age   HENT:      Head: Normocephalic. Cardiovascular:      Rate and Rhythm: Normal rate and regular rhythm. Heart sounds: Normal heart sounds. Pulmonary:      Effort: Pulmonary effort is normal.      Breath sounds: Normal breath sounds. Abdominal:      Palpations: Abdomen is soft. Neurological:      Mental Status: He is alert. ASSESSMENT and PLAN  Diagnoses and all orders for this visit:    1. Hypertension, unspecified type   Reasonable control, continue medicines and bp monitoring  2. Prostate cancer [facility])   CARMEN recurrence  3. Prediabetes   Recommended weight reduction  4. AK (actinic keratosis)    Rosario Bourne MD    Follow-up and Dispositions    · Return in about 6 months (around 8/23/2022) for medicare wellness x30 min.

## 2022-02-23 NOTE — PATIENT INSTRUCTIONS
Office Policies    Phone calls/patient messages:            Please allow up to 24 hours for someone in the office to contact you about your call or message. Be mindful your provider may be out of the office or your message may require further review. We encourage you to use Airseed for your messages as this is a faster, more efficient way to communicate with our office                         Medication Refills:            Prescription medications require 48-72 business hours to process. We encourage you to use Airseed for your refills. For controlled medications: Please allow 72 business hours to process. Certain medications may require you to  a written prescription at our office. NO narcotic/controlled medications will be prescribed after 4pm Monday through Friday or on weekends              Form/Paperwork Completion:            Please note a $25 fee may incur for all paperwork for completed by our providers. We ask that you allow 7-10 business days. Pre-payment is due prior to picking up/faxing the completed form. You may also download your forms to Airseed to have your doctor print off.

## 2022-03-02 RX ORDER — LOSARTAN POTASSIUM 100 MG/1
TABLET ORAL
Qty: 90 TABLET | Refills: 2 | Status: SHIPPED | OUTPATIENT
Start: 2022-03-02 | End: 2022-09-26 | Stop reason: SDUPTHER

## 2022-09-25 NOTE — PROGRESS NOTES
HISTORY OF PRESENT ILLNESS  Alec Frias is a 80 y.o. male. 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  90 Tablet 3     No Known Allergies   Lab Results   Component Value Date/Time    WBC 5.0 07/09/2021 11:25 AM    HGB 13.2 07/09/2021 11:25 AM    HCT 41.6 07/09/2021 11:25 AM    PLATELET 532 88/05/2351 11:25 AM    MCV 95.9 07/09/2021 11:25 AM     Lab Results   Component Value Date/Time    Hemoglobin A1c 5.8 (H) 07/09/2021 11:25 AM    Hemoglobin A1c 5.9 (H) 06/17/2020 10:04 AM    Hemoglobin A1c 5.7 (H) 05/23/2019 08:45 AM    Glucose 99 07/09/2021 11:25 AM    LDL, calculated 87 07/09/2021 11:25 AM    Creatinine 0.86 07/09/2021 11:25 AM      Lab Results   Component Value Date/Time    Cholesterol, total 172 07/09/2021 11:25 AM    HDL Cholesterol 73 07/09/2021 11:25 AM    LDL, calculated 87 07/09/2021 11:25 AM    Triglyceride 60 07/09/2021 11:25 AM    CHOL/HDL Ratio 2.4 07/09/2021 11:25 AM     Lab Results   Component Value Date/Time    GFR est non-AA >60 07/09/2021 11:25 AM    GFR est AA >60 07/09/2021 11:25 AM    Creatinine 0.86 07/09/2021 11:25 AM    BUN 18 07/09/2021 11:25 AM    Sodium 140 07/09/2021 11:25 AM    Potassium 4.3 07/09/2021 11:25 AM    Chloride 107 07/09/2021 11:25 AM    CO2 28 07/09/2021 11:25 AM        ROS    Physical Exam  Vitals and nursing note reviewed. Constitutional:       General: He is not in acute distress. Appearance: Normal appearance. He is well-developed. He is obese. Comments: Appears stated age   HENT:      Head: Normocephalic. Cardiovascular:      Rate and Rhythm: Normal rate and regular rhythm. Heart sounds: Normal heart sounds. Pulmonary:      Effort: Pulmonary effort is normal.      Breath sounds: Normal breath sounds. Abdominal:      Palpations: Abdomen is soft. Musculoskeletal:      Cervical back: Normal range of motion. Right lower leg: No edema. Left lower leg: No edema. Lymphadenopathy:      Cervical: No cervical adenopathy. Neurological:      Mental Status: He is alert. Psychiatric:         Mood and Affect: Mood normal.         Behavior: Behavior normal.         Thought Content: Thought content normal.         Judgment: Judgment normal.     ASSESSMENT and PLAN    ICD-10-CM ICD-9-CM    1. Prediabetes  R73.03 790.29 HEMOGLOBIN A1C WITH EAG      2. Hypertension, unspecified type  I10 401.9 CBC W/O DIFF      METABOLIC PANEL, BASIC      AST      ALT      3. History of prostate cancer  Z85.46 V10.46 PSA, DIAGNOSTIC (PROSTATE SPECIFIC AG)      This is the Subsequent Medicare Annual Wellness Exam, performed 12 months or more after the Initial AWV or the last Subsequent AWV    I have reviewed the patient's medical history in detail and updated the computerized patient record. Assessment/Plan   Education and counseling provided:  Are appropriate based on today's review and evaluation  End-of-Life planning (with patient's consent)  Covid vaccine recommended    1. Prediabetes  -     HEMOGLOBIN A1C WITH EAG;  Future   Diet and exercise and weight reduction recommeded  2. Hypertension, unspecified type  -     CBC W/O DIFF; Future  -     METABOLIC PANEL, BASIC; Future  -     AST; Future  -     ALT; Future   Reasonable control  3. History of prostate cancer  -     PSA, DIAGNOSTIC (PROSTATE SPECIFIC AG); Future       Depression Risk Factor Screening     3 most recent PHQ Screens 9/24/2022   Little interest or pleasure in doing things Not at all   Feeling down, depressed, irritable, or hopeless Not at all   Total Score PHQ 2 0       Alcohol & Drug Abuse Risk Screen   Do you average more than 1 drink per night or more than 7 drinks a week?: (P) No  In the past three months have you had more than 4 drinks containing alcohol on one occasion?: (P) Yes          Functional Ability and Level of Safety   Hearing:  Hearing: (P) additional comments below  Hearing comments: (P) In process of procuring hearing aids. Activities of Daily Living: The home contains: (P) handrails, rugs  Functional ADLs: (P) Patient does total self care   Ambulation:  Patient ambulates: (P) with no difficulty   Fall Risk:  Fall Risk Assessment, last 12 mths 9/26/2022   Able to walk? Yes   Fall in past 12 months? 0   Do you feel unsteady?  0   Are you worried about falling 0     Abuse Screen:  Do you ever feel afraid of your partner?: (P) No  Are you in a relationship with someone who physically or mentally threatens you?: (P) No  Is it safe for you to go home?: (P) Yes      Cognitive Screening   Has your family/caregiver stated any concerns about your memory?: (P) No   Cognitive Screening: Normal - Verbal Fluency Test    Health Maintenance Due     Health Maintenance Due   Topic Date Due    COVID-19 Vaccine (4 - Booster for [employer] series) 02/12/2022    Medicare Yearly Exam  07/10/2022       Patient Care Team   Patient Care Team:  Floyd Baer MD as PCP - General  Floyd Baer MD as PCP - [facility] Empaneled Provider  Nito Schmidt MD (General Surgery)  Ahsan Raya MD (Ophthalmology)  Kimberly Arrington DDS (Dental General Practice)  Nestor Lindsay MD as Surgeon (General Surgery)    History     Patient Active Problem List   Diagnosis Code    Essential hypertension, benign I10    Obesity E66.9    Prostate cancer [facility]) F04    Umbilical hernia Q96.6    Prediabetes R73.03    Advanced care planning/counseling discussion Z71.89     Past Medical History:   Diagnosis Date    Arthritis     Cancer ([facility] [address] 2004    prostate    Hypertension       Past Surgical History:   Procedure Laterality Date    HX CATARACT REMOVAL      bilateral    HX COLONOSCOPY  10/29/2012    Repeat in 10 years    HX CYST REMOVAL      Hands and Pilonidal cyst removal (8 from spine)    HX HERNIA REPAIR  01/26/8416    Umbilical hernia repair    HX PROSTATECTOMY  2003     Current Outpatient Medications   Medication Sig Dispense Refill    amLODIPine (NORVASC) 5 mg tablet Take 1 Tablet by mouth daily. 90 Tablet 3    losartan (COZAAR) 100 mg tablet TAKE 1 TABLET BY MOUTH EVERY DAY 90 Tablet 2     No Known Allergies    Family History   Problem Relation Age of Onset    High Cholesterol Mother     Stroke Father     Alcohol abuse Father     Other Father         tobacco abuse    No Known Problems Sister     Cancer Brother         Skin CA    Other Son         Herpes in the eyes (while in [employer])    Cancer Daughter 29        Ovarian CA     Social History     Tobacco Use    Smoking status: Never    Smokeless tobacco: Never    Tobacco comments:     3 months at 12years old/cigar use (occasionally 22 yrs old)   Substance Use Topics    Alcohol use:  Yes     Alcohol/week: 4.0 standard drinks     Types: 4 Glasses of wine per week         Fadia David MD

## 2022-09-26 ENCOUNTER — OFFICE VISIT (OUTPATIENT)
Dept: INTERNAL MEDICINE CLINIC | Age: 82
End: 2022-09-26
Payer: MEDICARE

## 2022-09-26 VITALS
SYSTOLIC BLOOD PRESSURE: 138 MMHG | DIASTOLIC BLOOD PRESSURE: 85 MMHG | HEART RATE: 68 BPM | TEMPERATURE: 97.4 F | WEIGHT: 242 LBS | OXYGEN SATURATION: 96 % | HEIGHT: 72 IN | BODY MASS INDEX: 32.78 KG/M2 | RESPIRATION RATE: 16 BRPM

## 2022-09-26 DIAGNOSIS — R73.03 PREDIABETES: Primary | ICD-10-CM

## 2022-09-26 DIAGNOSIS — Z85.46 HISTORY OF PROSTATE CANCER: ICD-10-CM

## 2022-09-26 DIAGNOSIS — I10 HYPERTENSION, UNSPECIFIED TYPE: ICD-10-CM

## 2022-09-26 PROCEDURE — 1101F PT FALLS ASSESS-DOCD LE1/YR: CPT | Performed by: INTERNAL MEDICINE

## 2022-09-26 PROCEDURE — G8510 SCR DEP NEG, NO PLAN REQD: HCPCS | Performed by: INTERNAL MEDICINE

## 2022-09-26 PROCEDURE — G0439 PPPS, SUBSEQ VISIT: HCPCS | Performed by: INTERNAL MEDICINE

## 2022-09-26 PROCEDURE — G8754 DIAS BP LESS 90: HCPCS | Performed by: INTERNAL MEDICINE

## 2022-09-26 PROCEDURE — G8417 CALC BMI ABV UP PARAM F/U: HCPCS | Performed by: INTERNAL MEDICINE

## 2022-09-26 PROCEDURE — 1123F ACP DISCUSS/DSCN MKR DOCD: CPT | Performed by: INTERNAL MEDICINE

## 2022-09-26 PROCEDURE — G8752 SYS BP LESS 140: HCPCS | Performed by: INTERNAL MEDICINE

## 2022-09-26 PROCEDURE — G8536 NO DOC ELDER MAL SCRN: HCPCS | Performed by: INTERNAL MEDICINE

## 2022-09-26 PROCEDURE — G8427 DOCREV CUR MEDS BY ELIG CLIN: HCPCS | Performed by: INTERNAL MEDICINE

## 2022-09-26 RX ORDER — AMLODIPINE BESYLATE 5 MG/1
5 TABLET ORAL DAILY
Qty: 90 TABLET | Refills: 3 | Status: SHIPPED | OUTPATIENT
Start: 2022-09-26

## 2022-09-26 RX ORDER — LOSARTAN POTASSIUM 100 MG/1
TABLET ORAL
Qty: 90 TABLET | Refills: 2 | Status: SHIPPED | OUTPATIENT
Start: 2022-09-26

## 2022-09-26 NOTE — PROGRESS NOTES
1. \"Have you been to the ER, urgent care clinic since your last visit? Hospitalized since your last visit? \" No    2. \"Have you seen or consulted any other health care providers outside of the [address] [place] [address] since your last visit? \" No     3. For patients aged 39-70: Has the patient had a colonoscopy / FIT/ Cologuard?  NA - based on age

## 2022-09-26 NOTE — PATIENT INSTRUCTIONS
Office Policies    Phone calls/patient messages:            Please allow up to 24 hours for someone in the office to contact you about your call or message. Be mindful your provider may be out of the office or your message may require further review. We encourage you to use Xtone for your messages as this is a faster, more efficient way to communicate with our office                         Medication Refills:            Prescription medications require 48-72 business hours to process. We encourage you to use Xtone for your refills. For controlled medications: Please allow 72 business hours to process. Certain medications may require you to  a written prescription at our office. NO narcotic/controlled medications will be prescribed after 4pm Monday through Friday or on weekends              Form/Paperwork Completion:            Please note a $25 fee may incur for all paperwork for completed by our providers. We ask that you allow 7-10 business days. Pre-payment is due prior to picking up/faxing the completed form. You may also download your forms to Xtone to have your doctor print off.

## 2022-09-26 NOTE — TELEPHONE ENCOUNTER
Future Appointments:  No future appointments. Last Appointment With Me:  9/26/2022     Requested Prescriptions     Pending Prescriptions Disp Refills    amLODIPine (NORVASC) 5 mg tablet 90 Tablet 3     Sig: Take 1 Tablet by mouth daily.     losartan (COZAAR) 100 mg tablet 90 Tablet 2     Sig: TAKE 1 TABLET BY MOUTH EVERY DAY

## 2022-09-27 LAB
ALT SERPL-CCNC: 21 U/L (ref 12–78)
ANION GAP SERPL CALC-SCNC: 0 MMOL/L (ref 5–15)
AST SERPL-CCNC: 12 U/L (ref 15–37)
BUN SERPL-MCNC: 13 MG/DL (ref 6–20)
BUN/CREAT SERPL: 15 (ref 12–20)
CALCIUM SERPL-MCNC: 8.6 MG/DL (ref 8.5–10.1)
CHLORIDE SERPL-SCNC: 108 MMOL/L (ref 97–108)
CO2 SERPL-SCNC: 30 MMOL/L (ref 21–32)
CREAT SERPL-MCNC: 0.85 MG/DL (ref 0.7–1.3)
ERYTHROCYTE [DISTWIDTH] IN BLOOD BY AUTOMATED COUNT: 14.7 % (ref 11.5–14.5)
EST. AVERAGE GLUCOSE BLD GHB EST-MCNC: 131 MG/DL
GLUCOSE SERPL-MCNC: 111 MG/DL (ref 65–100)
HBA1C MFR BLD: 6.2 % (ref 4–5.6)
HCT VFR BLD AUTO: 44.5 % (ref 36.6–50.3)
HGB BLD-MCNC: 14.1 G/DL (ref 12.1–17)
MCH RBC QN AUTO: 31 PG (ref 26–34)
MCHC RBC AUTO-ENTMCNC: 31.7 G/DL (ref 30–36.5)
MCV RBC AUTO: 97.8 FL (ref 80–99)
NRBC # BLD: 0 K/UL (ref 0–0.01)
NRBC BLD-RTO: 0 PER 100 WBC
PLATELET # BLD AUTO: 203 K/UL (ref 150–400)
PMV BLD AUTO: 12.4 FL (ref 8.9–12.9)
POTASSIUM SERPL-SCNC: 4.8 MMOL/L (ref 3.5–5.1)
PSA SERPL-MCNC: 0 NG/ML (ref 0.01–4)
RBC # BLD AUTO: 4.55 M/UL (ref 4.1–5.7)
SODIUM SERPL-SCNC: 138 MMOL/L (ref 136–145)
WBC # BLD AUTO: 4.6 K/UL (ref 4.1–11.1)

## 2022-10-31 ENCOUNTER — HOSPITAL ENCOUNTER (EMERGENCY)
Age: 82
Discharge: HOME | End: 2022-10-31
Attending: EMERGENCY MEDICINE
Payer: MEDICARE

## 2022-10-31 ENCOUNTER — APPOINTMENT (OUTPATIENT)
Dept: GENERAL RADIOLOGY | Age: 82
End: 2022-10-31
Attending: EMERGENCY MEDICINE
Payer: MEDICARE

## 2022-10-31 VITALS
HEART RATE: 95 BPM | RESPIRATION RATE: 22 BRPM | OXYGEN SATURATION: 93 % | BODY MASS INDEX: 32.11 KG/M2 | WEIGHT: 236.77 LBS | DIASTOLIC BLOOD PRESSURE: 60 MMHG | SYSTOLIC BLOOD PRESSURE: 144 MMHG | TEMPERATURE: 99 F

## 2022-10-31 DIAGNOSIS — J20.9 ACUTE BRONCHITIS, UNSPECIFIED ORGANISM: Primary | ICD-10-CM

## 2022-10-31 DIAGNOSIS — R05.1 ACUTE COUGH: ICD-10-CM

## 2022-10-31 LAB
COMMENT, HOLDF: NORMAL
SAMPLES BEING HELD,HOLD: NORMAL
TROPONIN-HIGH SENSITIVITY: 9 NG/L (ref 0–76)

## 2022-10-31 PROCEDURE — 94640 AIRWAY INHALATION TREATMENT: CPT

## 2022-10-31 PROCEDURE — 74011636637 HC RX REV CODE- 636/637: Performed by: EMERGENCY MEDICINE

## 2022-10-31 PROCEDURE — 99284 EMERGENCY DEPT VISIT MOD MDM: CPT

## 2022-10-31 PROCEDURE — 74011250637 HC RX REV CODE- 250/637: Performed by: EMERGENCY MEDICINE

## 2022-10-31 PROCEDURE — 36415 COLL VENOUS BLD VENIPUNCTURE: CPT

## 2022-10-31 PROCEDURE — 84484 ASSAY OF TROPONIN QUANT: CPT

## 2022-10-31 PROCEDURE — 71046 X-RAY EXAM CHEST 2 VIEWS: CPT

## 2022-10-31 PROCEDURE — 74011000250 HC RX REV CODE- 250: Performed by: EMERGENCY MEDICINE

## 2022-10-31 RX ORDER — IPRATROPIUM BROMIDE AND ALBUTEROL SULFATE 2.5; .5 MG/3ML; MG/3ML
3 SOLUTION RESPIRATORY (INHALATION)
Status: COMPLETED | OUTPATIENT
Start: 2022-10-31 | End: 2022-10-31

## 2022-10-31 RX ORDER — AZITHROMYCIN 250 MG/1
TABLET, FILM COATED ORAL
Qty: 6 TABLET | Refills: 0 | Status: SHIPPED | OUTPATIENT
Start: 2022-10-31 | End: 2022-11-04 | Stop reason: ALTCHOICE

## 2022-10-31 RX ORDER — PREDNISONE 20 MG/1
60 TABLET ORAL DAILY
Qty: 12 TABLET | Refills: 0 | Status: SHIPPED | OUTPATIENT
Start: 2022-10-31 | End: 2022-11-04 | Stop reason: ALTCHOICE

## 2022-10-31 RX ORDER — ACETAMINOPHEN 325 MG/1
650 TABLET ORAL
Status: COMPLETED | OUTPATIENT
Start: 2022-10-31 | End: 2022-10-31

## 2022-10-31 RX ORDER — GUAIFENESIN 600 MG/1
1200 TABLET, EXTENDED RELEASE ORAL EVERY 12 HOURS
Status: DISCONTINUED | OUTPATIENT
Start: 2022-10-31 | End: 2022-10-31 | Stop reason: HOSPADM

## 2022-10-31 RX ORDER — PREDNISONE 20 MG/1
60 TABLET ORAL
Status: COMPLETED | OUTPATIENT
Start: 2022-10-31 | End: 2022-10-31

## 2022-10-31 RX ORDER — GUAIFENESIN 600 MG/1
600 TABLET, EXTENDED RELEASE ORAL 2 TIMES DAILY
Qty: 10 TABLET | Refills: 0 | Status: SHIPPED | OUTPATIENT
Start: 2022-10-31 | End: 2022-11-05

## 2022-10-31 RX ORDER — ALBUTEROL SULFATE 90 UG/1
2 AEROSOL, METERED RESPIRATORY (INHALATION)
Qty: 1 EACH | Refills: 0 | Status: SHIPPED | OUTPATIENT
Start: 2022-10-31 | End: 2022-11-04 | Stop reason: SDUPTHER

## 2022-10-31 RX ADMIN — GUAIFENESIN 1200 MG: 600 TABLET, EXTENDED RELEASE ORAL at 08:31

## 2022-10-31 RX ADMIN — PREDNISONE 60 MG: 20 TABLET ORAL at 08:31

## 2022-10-31 RX ADMIN — IPRATROPIUM BROMIDE AND ALBUTEROL SULFATE 3 ML: .5; 3 SOLUTION RESPIRATORY (INHALATION) at 08:31

## 2022-10-31 RX ADMIN — ACETAMINOPHEN 650 MG: 325 TABLET ORAL at 08:31

## 2022-10-31 NOTE — ED PROVIDER NOTES
80-year-old male presents from home complaining of cough and chest congestion. States he started getting sick about a week ago. Thought it was just an upper respiratory infection but his symptoms gradually worsened over the past several days. He is done 2 home COVID test both of which were negative. He states has been coughing up green and yellow mucus. He also states he has had fevers at home but does not know what his temperature was. Denies any vomiting or diarrhea. No chest pain, abdominal pain. Patient does not smoke cigarettes. Denies any history of chronic lung disease. He has been taking over-the-counter cold medication with little relief of his symptoms. Past medical history significant for hypertension. His wife has been sick with similar symptoms and is currently being seen at her primary care doctor's office.        Past Medical History:   Diagnosis Date    Arthritis     Cancer ([facility] [address] 2004    prostate    Hypertension        Past Surgical History:   Procedure Laterality Date    HX CATARACT REMOVAL      bilateral    HX COLONOSCOPY  10/29/2012    Repeat in 10 years    HX CYST REMOVAL      Hands and Pilonidal cyst removal (8 from spine)    HX HERNIA REPAIR  52/04/8005    Umbilical hernia repair    HX PROSTATECTOMY  2003         Family History:   Problem Relation Age of Onset    High Cholesterol Mother     Stroke Father     Alcohol abuse Father     Other Father         tobacco abuse    No Known Problems Sister     Cancer Brother         Skin CA    Other Son         Herpes in the eyes (while in [employer])    Cancer Daughter 28        Ovarian CA       Social History     Socioeconomic History    Marital status:      Spouse name: Not on file    Number of children: Not on file    Years of education: Not on file    Highest education level: Not on file   Occupational History    Not on file   Tobacco Use    Smoking status: Never    Smokeless tobacco: Never    Tobacco comments:     3 months at 12 years old/cigar use (occasionally 22 yrs old)   Substance and Sexual Activity    Alcohol use: Yes     Alcohol/week: 4.0 standard drinks     Types: 4 Glasses of wine per week    Drug use: Yes     Types: Prescription, OTC    Sexual activity: Yes     Partners: Female   Other Topics Concern    Not on file   Social History Narrative    Not on file     Social Determinants of Health     Financial Resource Strain: Low Risk     Difficulty of Paying Living Expenses: Not very hard   Food Insecurity: No Food Insecurity    Worried About Running Out of Food in the Last Year: Never true    Ran Out of Food in the Last Year: Never true   Transportation Needs: Not on file   Physical Activity: Not on file   Stress: Not on file   Social Connections: Not on file   Intimate Partner Violence: Not on file   Housing Stability: Not on file         ALLERGIES: Patient has no known allergies. Review of Systems   Constitutional:  Negative for diaphoresis and fever. HENT:  Negative for facial swelling. Eyes:  Negative for visual disturbance. Respiratory:  Positive for cough. Cardiovascular:  Negative for chest pain. Gastrointestinal:  Negative for abdominal pain. Genitourinary:  Negative for dysuria. Musculoskeletal:  Negative for joint swelling. Skin:  Negative for rash. Neurological:  Negative for headaches. Hematological:  Negative for adenopathy. Psychiatric/Behavioral:  Negative for suicidal ideas. Vitals:    10/31/22 0821   BP: 132/64   Pulse: 95   Resp: 22   Temp: 99 °F (37.2 °C)   SpO2: 91%   Weight: 107.4 kg (236 lb 12.4 oz)            Physical Exam  Vitals and nursing note reviewed. Constitutional:       General: He is not in acute distress. Appearance: He is well-developed. He is not ill-appearing. HENT:      Head: Normocephalic and atraumatic. Eyes:      Pupils: Pupils are equal, round, and reactive to light. Cardiovascular:      Rate and Rhythm: Normal rate.    Pulmonary:      Effort: Pulmonary effort is normal. No respiratory distress. Breath sounds: Wheezing and rhonchi present. Abdominal:      General: There is no distension. Musculoskeletal:         General: Normal range of motion. Cervical back: Normal range of motion and neck supple. Skin:     General: Skin is warm and dry. Neurological:      Mental Status: He is alert and oriented to person, place, and time. MDM  Number of Diagnoses or Management Options  Acute bronchitis, unspecified organism  Acute cough  Diagnosis management comments: Assessment: Patient here with cough and chest congestion. His temperature was slightly elevated at 99 and his O2 saturations were running in the low 90%. Does not show any respiratory distress. He does have scattered rhonchi and some expiratory wheezes on exam.  We will check a chest x-ray given albuterol breathing treatment. This is most likely a viral illness but I think he would also benefit from a course of steroids and antibiotics given his green sputum and low-grade fevers. We will continue to monitor his O2 saturation and reassess after his breathing treatment. Amount and/or Complexity of Data Reviewed  Clinical lab tests: reviewed  Tests in the radiology section of CPT®: reviewed         9:32 AM  Patient is resting much more comfortably now. Breath sounds are significantly improved with decreased wheezing and coughing. Heart rate and blood pressure unremarkable. His O2 sats remained in the low 90% on room air but the patient is comfortable and denies any shortness of breath. Symptoms most likely due to bronchitis. No evidence of pneumonia on his chest x-ray. I think he can be safely discharged home with with prednisone, azithromycin, albuterol. I also wrote for Mucinex. He can follow-up with his primary care doctor next week or return to the ER if he has any worsening symptoms.   Procedures

## 2022-10-31 NOTE — ED NOTES
Patient reports he is feeling much better. He reports he can breathe better and does not feel short of breath.

## 2022-10-31 NOTE — ED TRIAGE NOTES
Triage Note: Patient arrives to ER complaining of a cough, fever, chills, nasal congestion for the last 7 days. Has taken multiple Covid tests, that resulted negative. Patient has been taking tylenol for fever and Nyquil. Has concerns for pneumonia. Ambulatory to room.

## 2022-10-31 NOTE — ED NOTES
Dr Mar Lao relates that the patient may be discharged home. The patient relates that he feels comfortable being discharged at this time. Reviewed d/c instructions with the patient, highlighting medication considerations, the importance of medical follow-up and s&s requiring more immediate medical attention. Pt verbalizes understanding of the instructions given. Gait steady. Respirations even and unlabored.

## 2022-10-31 NOTE — ED NOTES
Patient's SPO2 noted to be 88% with a good wave form on room air. Pt denies shortness of breath and relates that he feels better than he did when he first arrived. Dr Akers William aware of SPO2 level and relates to ambulate with the patient and re-check SPO2. SPO2 91-93% on room air after ambulation. Pt relates that he feels \"good. \"

## 2022-11-04 ENCOUNTER — DOCUMENTATION ONLY (OUTPATIENT)
Dept: INTERNAL MEDICINE CLINIC | Age: 82
End: 2022-11-04

## 2022-11-04 ENCOUNTER — OFFICE VISIT (OUTPATIENT)
Dept: INTERNAL MEDICINE CLINIC | Age: 82
End: 2022-11-04
Payer: MEDICARE

## 2022-11-04 VITALS
DIASTOLIC BLOOD PRESSURE: 73 MMHG | RESPIRATION RATE: 16 BRPM | OXYGEN SATURATION: 93 % | TEMPERATURE: 97.7 F | HEART RATE: 74 BPM | HEIGHT: 72 IN | SYSTOLIC BLOOD PRESSURE: 148 MMHG | BODY MASS INDEX: 32.37 KG/M2 | WEIGHT: 239 LBS

## 2022-11-04 DIAGNOSIS — R05.8 POST-VIRAL COUGH SYNDROME: Primary | ICD-10-CM

## 2022-11-04 PROCEDURE — 99213 OFFICE O/P EST LOW 20 MIN: CPT | Performed by: STUDENT IN AN ORGANIZED HEALTH CARE EDUCATION/TRAINING PROGRAM

## 2022-11-04 RX ORDER — ALBUTEROL SULFATE 90 UG/1
2 AEROSOL, METERED RESPIRATORY (INHALATION)
Qty: 1 EACH | Refills: 0 | Status: SHIPPED | OUTPATIENT
Start: 2022-11-04

## 2022-11-04 RX ORDER — DEXTROMETHORPHAN HYDROBROMIDE AND GUAIFENESIN 10; 200 MG/1; MG/1
1 CAPSULE, GELATIN COATED ORAL
Qty: 30 CAPSULE | Refills: 0 | Status: SHIPPED | OUTPATIENT
Start: 2022-11-04 | End: 2022-11-19

## 2022-11-04 NOTE — PROGRESS NOTES
[facility] in [facility]   Internal Medicine  [address] [facility] [address], [address] [facility]  [address]  [place], [address] [place] [address]  [phone]      Primary Care Visit Note    Assessment/Plan:     Diagnoses and all orders for this visit:    1. Post-viral cough syndrome  -     dextromethorphan-guaiFENesin (Coricidin HBP Chest Govind-Cough)  mg cap; Take 1 Tablet by mouth every four to six (4-6) hours as needed for Cough for up to 15 days. -     albuterol (PROVENTIL HFA, VENTOLIN HFA, PROAIR HFA) 90 mcg/actuation inhaler; Take 2 Puffs by inhalation every four (4) hours as needed for Wheezing. Choco Fisher MD    CC:     Chief Complaint   Patient presents with    Cough     Coughing spells, 2 min at a time time non stop. Coughing up a lot of mucus. HPI:     Elvis Demarco is a 80 y.o. male who presents for ED follow up. Patient here for acute bronchitis and coughing. He is producing a lot of mucus, initially green then yellow now clear    He is having trouble sleeping bc he is coughing so much. He was improved \"tremendously\" with the z-pack and prednisone, but frustrated by continued cough. He works on a farm and feels very healthy. He is walking 3.5 miles on average per day and still cuts wood. ROS:   All 10 point review of systems negative except those mentioned in the HPI. Past Medical History:      Active Ambulatory Problems     Diagnosis Date Noted    Essential hypertension, benign 04/29/2010    Obesity 04/29/2010    Prostate cancer ([facility] [address] 19/54/1431    Umbilical hernia 15/12/5726    Prediabetes 10/30/2013    Advanced care planning/counseling discussion 11/04/2015     Resolved Ambulatory Problems     Diagnosis Date Noted    No Resolved Ambulatory Problems     Past Medical History:   Diagnosis Date    Acute bronchitis due to other specified organisms 10/31/2022    Arthritis     Cancer ([facility] [address] 2004    Hypertension           Current Medications: Current Outpatient Medications:     albuterol (PROVENTIL HFA, VENTOLIN HFA, PROAIR HFA) 90 mcg/actuation inhaler, Take 2 Puffs by inhalation every four (4) hours as needed for Wheezing., Disp: 1 Each, Rfl: 0    inhalational spacing device, 1 Each by Does Not Apply route as needed for Wheezing., Disp: 1 Each, Rfl: 0    guaiFENesin ER (Mucinex) 600 mg ER tablet, Take 1 Tablet by mouth two (2) times a day for 5 days. , Disp: 10 Tablet, Rfl: 0    amLODIPine (NORVASC) 5 mg tablet, Take 1 Tablet by mouth daily. , Disp: 90 Tablet, Rfl: 3    losartan (COZAAR) 100 mg tablet, TAKE 1 TABLET BY MOUTH EVERY DAY, Disp: 90 Tablet, Rfl: 2      Past Surgical History:     Past Surgical History:   Procedure Laterality Date    HX CATARACT REMOVAL      bilateral    HX COLONOSCOPY  10/29/2012    Repeat in 10 years    HX CYST REMOVAL      Hands and Pilonidal cyst removal (8 from spine)    HX HERNIA REPAIR  62/47/7702    Umbilical hernia repair    HX PROSTATECTOMY  2003         Family History:     Family History   Problem Relation Age of Onset    High Cholesterol Mother     Stroke Father     Alcohol abuse Father     Other Father         tobacco abuse    No Known Problems Sister     Cancer Brother         Skin CA    Other Son         Herpes in the eyes (while in [employer])    Cancer Daughter 28        Ovarian CA         Social History:     Social History     Socioeconomic History    Marital status:      Spouse name: Not on file    Number of children: Not on file    Years of education: Not on file    Highest education level: Not on file   Occupational History    Not on file   Tobacco Use    Smoking status: Never    Smokeless tobacco: Never    Tobacco comments:     3 months at 12years old/cigar use (occasionally 22 yrs old)   Vaping Use    Vaping Use: Never used   Substance and Sexual Activity    Alcohol use:  Yes     Alcohol/week: 4.0 standard drinks     Types: 4 Glasses of wine per week    Drug use: Yes     Types: Prescription, OTC    Sexual activity: Yes     Partners: Female   Other Topics Concern    Not on file   Social History Narrative    Not on file     Social Determinants of Health     Financial Resource Strain: Low Risk     Difficulty of Paying Living Expenses: Not very hard   Food Insecurity: No Food Insecurity    Worried About Running Out of Food in the Last Year: Never true    Ran Out of Food in the Last Year: Never true   Transportation Needs: Not on file   Physical Activity: Not on file   Stress: Not on file   Social Connections: Not on file   Intimate Partner Violence: Not on file   Housing Stability: Not on file            Visit Vitals  BP (!) 148/73   Pulse 74   Temp 97.7 °F (36.5 °C) (Temporal)   Resp 16   Ht 6' (1.829 m)   Wt 239 lb (108.4 kg)   SpO2 93%   BMI 32.41 kg/m²       Physical Exam:   General - Well appearing male  HEENT - EOMI, non-icteric sclera  Pulm - clear to auscultation bilaterally  Cardio - RRR, normal S1 S2, no murmur  Abd - soft, nontender, no masses, no HSM  Extrem - no edema, +2 distal pulses  Neuro-  Alert and oriented, No focal deficits           Labs/Imaging:     Labs and imaging reviewed by me and significant for:      Lab Results   Component Value Date/Time    WBC 4.6 09/26/2022 09:47 AM    WBC 6.0 05/10/2012 07:53 AM    HGB 14.1 09/26/2022 09:47 AM    HCT 44.5 09/26/2022 09:47 AM    PLATELET 320 22/95/4158 09:47 AM    MCV 97.8 09/26/2022 09:47 AM     Lab Results   Component Value Date/Time    Hemoglobin A1c 6.2 (H) 09/26/2022 09:47 AM    Hemoglobin A1c (POC) 5.9 04/28/2016 11:03 AM     Lab Results   Component Value Date/Time    TSH 1.10 07/09/2021 11:25 AM

## 2022-11-04 NOTE — PROGRESS NOTES
1. \"Have you been to the ER, urgent care clinic since your last visit? Hospitalized since your last visit? \" Yes [facility] Acute bronchitis    2. \"Have you seen or consulted any other health care providers outside of the [address] [place] [address] since your last visit? \" No     3. For patients aged 39-70: Has the patient had a colonoscopy / FIT/ Cologuard? Yes - Care Gap present.  Most recent result on file      I

## 2022-12-27 DIAGNOSIS — R05.8 POST-VIRAL COUGH SYNDROME: ICD-10-CM

## 2022-12-28 RX ORDER — ALBUTEROL SULFATE 90 UG/1
AEROSOL, METERED RESPIRATORY (INHALATION)
Qty: 6.7 G | Refills: 1 | Status: SHIPPED | OUTPATIENT
Start: 2022-12-28

## 2023-07-17 ENCOUNTER — TELEPHONE (OUTPATIENT)
Age: 83
End: 2023-07-17

## 2023-07-17 NOTE — TELEPHONE ENCOUNTER
----- Message from Weston White sent at 7/17/2023  9:08 AM EDT -----  Subject: Message to Provider    QUESTIONS  Information for Provider? Pt states that he was scheduled for a physical   for 07/26; SE did not see an appt scheduled for pt at all; please advise   pt  ---------------------------------------------------------------------------  --------------  Neftaly Barrientos INFO  [phone]; OK to leave message on voicemail  ---------------------------------------------------------------------------  --------------  SCRIPT ANSWERS  Relationship to Patient?  Self

## 2023-07-17 NOTE — TELEPHONE ENCOUNTER
[facility] called    States pt disconnected from them and asked to be called back    States was regarding pt claiming he had appt on 7/26 at 10 am with pcp    States pt wants appt at 10 am asap, no reason stated.       Please call pt to discuss

## 2023-09-28 ENCOUNTER — OFFICE VISIT (OUTPATIENT)
Age: 83
End: 2023-09-28
Payer: MEDICARE

## 2023-09-28 VITALS
BODY MASS INDEX: 31.97 KG/M2 | SYSTOLIC BLOOD PRESSURE: 142 MMHG | DIASTOLIC BLOOD PRESSURE: 84 MMHG | TEMPERATURE: 97.2 F | HEART RATE: 71 BPM | WEIGHT: 236 LBS | HEIGHT: 72 IN | RESPIRATION RATE: 16 BRPM | OXYGEN SATURATION: 98 %

## 2023-09-28 DIAGNOSIS — I10 HYPERTENSION, UNSPECIFIED TYPE: Primary | ICD-10-CM

## 2023-09-28 DIAGNOSIS — R73.03 PREDIABETES: ICD-10-CM

## 2023-09-28 DIAGNOSIS — Z85.46 HISTORY OF PROSTATE CANCER: ICD-10-CM

## 2023-09-28 PROCEDURE — 3077F SYST BP >= 140 MM HG: CPT | Performed by: INTERNAL MEDICINE

## 2023-09-28 PROCEDURE — 1123F ACP DISCUSS/DSCN MKR DOCD: CPT | Performed by: INTERNAL MEDICINE

## 2023-09-28 PROCEDURE — G0439 PPPS, SUBSEQ VISIT: HCPCS | Performed by: INTERNAL MEDICINE

## 2023-09-28 PROCEDURE — 3079F DIAST BP 80-89 MM HG: CPT | Performed by: INTERNAL MEDICINE

## 2023-09-28 RX ORDER — LOSARTAN POTASSIUM 100 MG/1
TABLET ORAL
Qty: 90 TABLET | Refills: 2 | Status: SHIPPED | OUTPATIENT
Start: 2023-09-28

## 2023-09-28 RX ORDER — AMLODIPINE BESYLATE 5 MG/1
5 TABLET ORAL DAILY
Qty: 90 TABLET | Refills: 4 | Status: SHIPPED | OUTPATIENT
Start: 2023-09-28

## 2023-09-28 SDOH — ECONOMIC STABILITY: FOOD INSECURITY: WITHIN THE PAST 12 MONTHS, THE FOOD YOU BOUGHT JUST DIDN'T LAST AND YOU DIDN'T HAVE MONEY TO GET MORE.: NEVER TRUE

## 2023-09-28 SDOH — ECONOMIC STABILITY: FOOD INSECURITY: WITHIN THE PAST 12 MONTHS, YOU WORRIED THAT YOUR FOOD WOULD RUN OUT BEFORE YOU GOT MONEY TO BUY MORE.: NEVER TRUE

## 2023-09-28 SDOH — ECONOMIC STABILITY: INCOME INSECURITY: HOW HARD IS IT FOR YOU TO PAY FOR THE VERY BASICS LIKE FOOD, HOUSING, MEDICAL CARE, AND HEATING?: NOT HARD AT ALL

## 2023-09-28 SDOH — ECONOMIC STABILITY: HOUSING INSECURITY
IN THE LAST 12 MONTHS, WAS THERE A TIME WHEN YOU DID NOT HAVE A STEADY PLACE TO SLEEP OR SLEPT IN A SHELTER (INCLUDING NOW)?: NO

## 2023-09-28 ASSESSMENT — PATIENT HEALTH QUESTIONNAIRE - PHQ9
SUM OF ALL RESPONSES TO PHQ QUESTIONS 1-9: 0
SUM OF ALL RESPONSES TO PHQ QUESTIONS 1-9: 0
SUM OF ALL RESPONSES TO PHQ9 QUESTIONS 1 & 2: 0
SUM OF ALL RESPONSES TO PHQ QUESTIONS 1-9: 0
SUM OF ALL RESPONSES TO PHQ QUESTIONS 1-9: 0
2. FEELING DOWN, DEPRESSED OR HOPELESS: 0
1. LITTLE INTEREST OR PLEASURE IN DOING THINGS: 0

## 2023-09-28 ASSESSMENT — LIFESTYLE VARIABLES
HOW MANY STANDARD DRINKS CONTAINING ALCOHOL DO YOU HAVE ON A TYPICAL DAY: 1 OR 2
HOW OFTEN DO YOU HAVE A DRINK CONTAINING ALCOHOL: 2-3 TIMES A WEEK

## 2023-09-28 NOTE — PROGRESS NOTES
1. \"Have you been to the ER, urgent care clinic since your last visit? Hospitalized since your last visit? \" No    2. \"Have you seen or consulted any other health care providers outside of the [address] [place] [address] since your last visit? \" No    3. For patients aged 43-73: Has the patient had a colonoscopy / FIT/ Cologuard?  No

## 2023-09-28 NOTE — PROGRESS NOTES
HISTORY OF PRESENT ILLNESS   Melvi Gutierrez   is a 80 y.o.  male. 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  Might take a nap   Will get hearing evaluation for hearing aids  No cp   Stable weight  Sbp 130-140  dbp 68-72  Some stress incontinence s/p prostatectomy  Patient Active Problem List    Diagnosis Date Noted    Advanced care planning/counseling discussion 11/04/2015    Prediabetes 10/30/2013    Essential hypertension, benign 04/59/8879    Umbilical hernia 31/35/4792    Obesity 04/29/2010     Current Outpatient Medications   Medication Sig Dispense Refill    albuterol sulfate HFA (PROVENTIL;VENTOLIN;PROAIR) 108 (90 Base) MCG/ACT inhaler INHALE 2 PUFFS BY MOUTH EVERY 4 HOURS AS NEEDED FOR WHEEZE      amLODIPine (NORVASC) 5 MG tablet Take 5 mg by mouth daily      losartan (COZAAR) 100 MG tablet TAKE 1 TABLET BY MOUTH EVERY DAY       No current facility-administered medications for this visit.      Not on File     BMP:   Lab Results   Component Value Date/Time     09/26/2022 09:47 AM    K 4.8 09/26/2022 09:47 AM     09/26/2022 09:47 AM    CO2 30 09/26/2022 09:47 AM    BUN 13 09/26/2022 09:47 AM    CREATININE 0.85 09/26/2022 09:47 AM    GLUCOSE 111 09/26/2022 09:47 AM    CALCIUM 8.6 09/26/2022 09:47 AM      CBC:   Lab Results   Component Value Date/Time    WBC 4.6 09/26/2022 09:47 AM    RBC 4.55 09/26/2022 09:47 AM    HGB 14.1 09/26/2022 09:47 AM    HCT 44.5 09/26/2022 09:47 AM    MCV 97.8 09/26/2022 09:47 AM    MCH 31.0 09/26/2022 09:47 AM    MCHC 31.7 09/26/2022 09:47 AM    RDW 14.7 09/26/2022 09:47 AM     09/26/2022 09:47 AM    MPV 12.4 09/26/2022 09:47 AM      Lipids   Lab Results   Component Value Date/Time    CHOL 172 07/09/2021 11:25 AM    TRIG 60

## 2023-09-29 LAB
ALBUMIN SERPL-MCNC: 3.9 G/DL (ref 3.5–5)
ALBUMIN/GLOB SERPL: 1.1 (ref 1.1–2.2)
ALP SERPL-CCNC: 65 U/L (ref 45–117)
ALT SERPL-CCNC: 19 U/L (ref 12–78)
ANION GAP SERPL CALC-SCNC: 2 MMOL/L (ref 5–15)
AST SERPL-CCNC: 14 U/L (ref 15–37)
BILIRUB SERPL-MCNC: 0.6 MG/DL (ref 0.2–1)
BUN SERPL-MCNC: 19 MG/DL (ref 6–20)
BUN/CREAT SERPL: 22 (ref 12–20)
CALCIUM SERPL-MCNC: 9 MG/DL (ref 8.5–10.1)
CHLORIDE SERPL-SCNC: 106 MMOL/L (ref 97–108)
CHOLEST SERPL-MCNC: 170 MG/DL
CO2 SERPL-SCNC: 28 MMOL/L (ref 21–32)
CREAT SERPL-MCNC: 0.86 MG/DL (ref 0.7–1.3)
ERYTHROCYTE [DISTWIDTH] IN BLOOD BY AUTOMATED COUNT: 14.5 % (ref 11.5–14.5)
EST. AVERAGE GLUCOSE BLD GHB EST-MCNC: 117 MG/DL
GLOBULIN SER CALC-MCNC: 3.6 G/DL (ref 2–4)
GLUCOSE SERPL-MCNC: 96 MG/DL (ref 65–100)
HBA1C MFR BLD: 5.7 % (ref 4–5.6)
HCT VFR BLD AUTO: 42.5 % (ref 36.6–50.3)
HDLC SERPL-MCNC: 72 MG/DL
HDLC SERPL: 2.4 (ref 0–5)
HGB BLD-MCNC: 13.6 G/DL (ref 12.1–17)
LDLC SERPL CALC-MCNC: 83.2 MG/DL (ref 0–100)
MCH RBC QN AUTO: 30.1 PG (ref 26–34)
MCHC RBC AUTO-ENTMCNC: 32 G/DL (ref 30–36.5)
MCV RBC AUTO: 94 FL (ref 80–99)
NRBC # BLD: 0 K/UL (ref 0–0.01)
NRBC BLD-RTO: 0 PER 100 WBC
PLATELET # BLD AUTO: 217 K/UL (ref 150–400)
PMV BLD AUTO: 12.2 FL (ref 8.9–12.9)
POTASSIUM SERPL-SCNC: 4.3 MMOL/L (ref 3.5–5.1)
PROT SERPL-MCNC: 7.5 G/DL (ref 6.4–8.2)
PSA SERPL-MCNC: 0 NG/ML (ref 0.01–4)
RBC # BLD AUTO: 4.52 M/UL (ref 4.1–5.7)
SODIUM SERPL-SCNC: 136 MMOL/L (ref 136–145)
TRIGL SERPL-MCNC: 74 MG/DL
VLDLC SERPL CALC-MCNC: 14.8 MG/DL
WBC # BLD AUTO: 5.8 K/UL (ref 4.1–11.1)

## 2023-12-03 ENCOUNTER — APPOINTMENT (OUTPATIENT)
Facility: HOSPITAL | Age: 83
End: 2023-12-03
Payer: MEDICARE

## 2023-12-03 ENCOUNTER — HOSPITAL ENCOUNTER (EMERGENCY)
Facility: HOSPITAL | Age: 83
Discharge: HOME | End: 2023-12-03
Attending: STUDENT IN AN ORGANIZED HEALTH CARE EDUCATION/TRAINING PROGRAM
Payer: MEDICARE

## 2023-12-03 VITALS
HEART RATE: 81 BPM | HEIGHT: 72 IN | TEMPERATURE: 99.5 F | WEIGHT: 218 LBS | BODY MASS INDEX: 29.53 KG/M2 | SYSTOLIC BLOOD PRESSURE: 145 MMHG | OXYGEN SATURATION: 97 % | DIASTOLIC BLOOD PRESSURE: 85 MMHG | RESPIRATION RATE: 16 BRPM

## 2023-12-03 DIAGNOSIS — J06.9 VIRAL URI WITH COUGH: Primary | ICD-10-CM

## 2023-12-03 LAB
FLUAV AG NPH QL IA: NEGATIVE
FLUBV AG NOSE QL IA: NEGATIVE
SARS-COV-2 RDRP RESP QL NAA+PROBE: NOT DETECTED
SOURCE: NORMAL

## 2023-12-03 PROCEDURE — 87635 SARS-COV-2 COVID-19 AMP PRB: CPT

## 2023-12-03 PROCEDURE — 99284 EMERGENCY DEPT VISIT MOD MDM: CPT

## 2023-12-03 PROCEDURE — 87804 INFLUENZA ASSAY W/OPTIC: CPT

## 2023-12-03 PROCEDURE — 71045 X-RAY EXAM CHEST 1 VIEW: CPT

## 2023-12-03 RX ORDER — BENZONATATE 200 MG/1
200 CAPSULE ORAL 3 TIMES DAILY PRN
Qty: 30 CAPSULE | Refills: 0 | Status: SHIPPED | OUTPATIENT
Start: 2023-12-03

## 2023-12-03 ASSESSMENT — PAIN SCALES - GENERAL
PAINLEVEL_OUTOF10: 0
PAINLEVEL_OUTOF10: 0

## 2023-12-03 NOTE — ED TRIAGE NOTES
Patient arrives ambulatory with wife with c/o cough since thanksgiving. Patient went to visit his son who was sick with URI. Patient reports congested cough with green phlegm. Reports fever last night but didn't check.

## 2023-12-03 NOTE — ED PROVIDER NOTES
HPI    80 y.o. male presenting with cough, patient has had mild illness for the past week after visiting his son for Thanksgiving. For the past 2 days has had severe cough with yellowish sputum production, tactile fever, malaise. Does not feel to sleep for 2 days due to severe cough. He has been vaccinated for COVID but not influenza no chronic medical issues, lifelong non-smoker. Yessenia Reese   has a past medical history of Acute bronchitis due to other specified organisms, Arthritis, Cancer ([address]), and Hypertension. Physical Exam  Vitals reviewed. Constitutional:       General: He is not in acute distress. Appearance: Normal appearance. HENT:      Head: Normocephalic. Mouth/Throat:      Mouth: Mucous membranes are moist.   Eyes:      Extraocular Movements: Extraocular movements intact. Pupils: Pupils are equal, round, and reactive to light. Cardiovascular:      Rate and Rhythm: Normal rate and regular rhythm. Pulses: Normal pulses. Pulmonary:      Effort: Pulmonary effort is normal. No respiratory distress. Comments: Basilar rhonchi and rales  Abdominal:      General: Abdomen is flat. Musculoskeletal:      Cervical back: Neck supple. No rigidity. Right lower leg: No edema. Left lower leg: No edema. Skin:     General: Skin is warm. Capillary Refill: Capillary refill takes less than 2 seconds. Neurological:      General: No focal deficit present. Mental Status: He is alert. Mental status is at baseline. Cranial Nerves: Cranial nerves 2-12 are intact. No cranial nerve deficit, dysarthria or facial asymmetry. Sensory: Sensation is intact. Motor: Motor function is intact. No weakness or pronator drift.       Coordination: Coordination normal.      Gait: Gait normal.   Psychiatric:         Mood and Affect: Mood normal.           LABORATORY TESTS:  Labs Reviewed   RAPID INFLUENZA A/B ANTIGENS   COVID-19, RAPID       IMAGING

## 2023-12-03 NOTE — ED NOTES
Bedside and Verbal shift change report given to Yisel Cortes RN (oncoming nurse) by Tacos Chen RN (offgoing nurse). Report included the following information ED SBAR, Intake/Output, MAR, and Recent Results.         Joey Francois RN  12/03/23 [id number]

## 2023-12-03 NOTE — ED NOTES
Discharge instructions provided. Pt verbalized understanding. Opportunity provided for questions. Pt discharged home.        Marielle Cochran RN  12/03/23 [id number]

## 2023-12-05 ENCOUNTER — OFFICE VISIT (OUTPATIENT)
Age: 83
End: 2023-12-05
Payer: MEDICARE

## 2023-12-05 VITALS
RESPIRATION RATE: 18 BRPM | WEIGHT: 229 LBS | BODY MASS INDEX: 31.02 KG/M2 | OXYGEN SATURATION: 97 % | SYSTOLIC BLOOD PRESSURE: 116 MMHG | HEART RATE: 99 BPM | DIASTOLIC BLOOD PRESSURE: 60 MMHG | TEMPERATURE: 97.1 F | HEIGHT: 72 IN

## 2023-12-05 DIAGNOSIS — J40 BRONCHITIS: Primary | ICD-10-CM

## 2023-12-05 PROCEDURE — G8427 DOCREV CUR MEDS BY ELIG CLIN: HCPCS | Performed by: INTERNAL MEDICINE

## 2023-12-05 PROCEDURE — 99213 OFFICE O/P EST LOW 20 MIN: CPT | Performed by: INTERNAL MEDICINE

## 2023-12-05 PROCEDURE — 3074F SYST BP LT 130 MM HG: CPT | Performed by: INTERNAL MEDICINE

## 2023-12-05 PROCEDURE — G8484 FLU IMMUNIZE NO ADMIN: HCPCS | Performed by: INTERNAL MEDICINE

## 2023-12-05 PROCEDURE — 1123F ACP DISCUSS/DSCN MKR DOCD: CPT | Performed by: INTERNAL MEDICINE

## 2023-12-05 PROCEDURE — 3078F DIAST BP <80 MM HG: CPT | Performed by: INTERNAL MEDICINE

## 2023-12-05 PROCEDURE — G8417 CALC BMI ABV UP PARAM F/U: HCPCS | Performed by: INTERNAL MEDICINE

## 2023-12-05 PROCEDURE — 4004F PT TOBACCO SCREEN RCVD TLK: CPT | Performed by: INTERNAL MEDICINE

## 2023-12-05 RX ORDER — AZITHROMYCIN 250 MG/1
250 TABLET, FILM COATED ORAL SEE ADMIN INSTRUCTIONS
Qty: 6 TABLET | Refills: 0 | Status: SHIPPED | OUTPATIENT
Start: 2023-12-05 | End: 2023-12-10

## 2023-12-05 NOTE — PROGRESS NOTES
HISTORY OF PRESENT ILLNESS   Chandra Etienne   is a 80 y.o.  male. Hx  HTN prediabetes, hx prostate cancer, elevated BMI . Here with his wife  Barbara Stoll to ER 12/3 for cough since thankssigiving  Flu and covid negative  Cxr-no acute process  Tessalon rx  Still with cough and phlegm        Last OV  Home BP weekly around 136/76  Stable weight  Stay active physically  Smaller portions -less redmeat only weekly  Plays golf weekly  Patient Active Problem List    Diagnosis Date Noted    Advanced care planning/counseling discussion 11/04/2015    Prediabetes 10/30/2013    Essential hypertension, benign 98/78/4734    Umbilical hernia 77/98/6601    Obesity 04/29/2010     Current Outpatient Medications   Medication Sig Dispense Refill    benzonatate (TESSALON) 200 MG capsule Take 1 capsule by mouth 3 times daily as needed for Cough 30 capsule 0    losartan (COZAAR) 100 MG tablet TAKE 1 TABLET BY MOUTH EVERY DAY 90 tablet 2    amLODIPine (NORVASC) 5 MG tablet Take 1 tablet by mouth daily 90 tablet 4     No current facility-administered medications for this visit.      No Known Allergies     BMP:   Lab Results   Component Value Date/Time     09/28/2023 02:15 PM    K 4.3 09/28/2023 02:15 PM     09/28/2023 02:15 PM    CO2 28 09/28/2023 02:15 PM    BUN 19 09/28/2023 02:15 PM    CREATININE 0.86 09/28/2023 02:15 PM    GLUCOSE 96 09/28/2023 02:15 PM    CALCIUM 9.0 09/28/2023 02:15 PM      CBC:   Lab Results   Component Value Date/Time    WBC 5.8 09/28/2023 02:15 PM    RBC 4.52 09/28/2023 02:15 PM    HGB 13.6 09/28/2023 02:15 PM    HCT 42.5 09/28/2023 02:15 PM    MCV 94.0 09/28/2023 02:15 PM    MCH 30.1 09/28/2023 02:15 PM    MCHC 32.0 09/28/2023 02:15 PM    RDW 14.5 09/28/2023 02:15 PM     09/28/2023 02:15 PM    MPV 12.2 09/28/2023 02:15 PM      Lipids   Lab Results   Component Value Date/Time    CHOL 170 09/28/2023 02:15 PM    TRIG 74 09/28/2023 02:15 PM    HDL 72 09/28/2023 02:15 PM    LDLCALC 83.2 09/28/2023 02:15

## 2023-12-05 NOTE — PROGRESS NOTES
1. \"Have you been to the ER, urgent care clinic since your last visit? Hospitalized since your last visit? \"       Discharged 12/3 - viral URI with cough      2. \"Have you seen or consulted any other health care providers outside of the [address] [place] [address] since your last visit? \" No

## 2024-03-27 NOTE — PROGRESS NOTES
HISTORY OF PRESENT ILLNESS   Steven Beltre   is a 83 y.o.  male.    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     No Known Allergies  Social History     Tobacco Use    Smoking status: Never    Smokeless tobacco: Never   Substance Use Topics    Alcohol use: Yes     Alcohol/week: 4.0 standard drinks of alcohol        BMP:   Lab Results   Component Value Date/Time     09/28/2023 02:15 PM    K 4.3 09/28/2023 02:15 PM     09/28/2023 02:15 PM    CO2 28 09/28/2023 02:15 PM    BUN 19 09/28/2023 02:15 PM    CREATININE 0.86 09/28/2023 02:15 PM    GLUCOSE 96 09/28/2023 02:15 PM    CALCIUM 9.0 09/28/2023 02:15 PM      CBC:   Lab Results   Component Value Date/Time    WBC 5.8 09/28/2023 02:15 PM    RBC 4.52 09/28/2023 02:15 PM    HGB 13.6 09/28/2023 02:15 PM    HCT 42.5 09/28/2023 02:15 PM    MCV 94.0 09/28/2023 02:15 PM    MCH 30.1 09/28/2023 02:15 PM    MCHC 32.0 09/28/2023 02:15 PM    RDW 14.5 09/28/2023 02:15 PM     09/28/2023 02:15 PM    MPV 12.2 09/28/2023 02:15 PM      Lipids   Lab Results   Component Value Date/Time    CHOL 170 09/28/2023 02:15 PM    TRIG 74 09/28/2023 02:15 PM    HDL 72 09/28/2023 02:15 PM    LDLCALC 83.2 09/28/2023 02:15 PM    CHOLHDLRATIO 2.4 09/28/2023 02:15

## 2024-03-28 ENCOUNTER — OFFICE VISIT (OUTPATIENT)
Age: 84
End: 2024-03-28
Payer: MEDICARE

## 2024-03-28 VITALS
RESPIRATION RATE: 14 BRPM | HEIGHT: 73 IN | TEMPERATURE: 97 F | DIASTOLIC BLOOD PRESSURE: 80 MMHG | OXYGEN SATURATION: 96 % | BODY MASS INDEX: 30.62 KG/M2 | HEART RATE: 67 BPM | SYSTOLIC BLOOD PRESSURE: 130 MMHG | WEIGHT: 231 LBS

## 2024-03-28 DIAGNOSIS — Z00.00 MEDICARE ANNUAL WELLNESS VISIT, SUBSEQUENT: ICD-10-CM

## 2024-03-28 DIAGNOSIS — I10 HYPERTENSION, UNSPECIFIED TYPE: Primary | ICD-10-CM

## 2024-03-28 DIAGNOSIS — R73.03 PREDIABETES: ICD-10-CM

## 2024-03-28 DIAGNOSIS — Z85.46 HISTORY OF PROSTATE CANCER: ICD-10-CM

## 2024-03-28 DIAGNOSIS — E66.9 OBESITY (BMI 30.0-34.9): ICD-10-CM

## 2024-03-28 PROCEDURE — 1036F TOBACCO NON-USER: CPT | Performed by: INTERNAL MEDICINE

## 2024-03-28 PROCEDURE — G8417 CALC BMI ABV UP PARAM F/U: HCPCS | Performed by: INTERNAL MEDICINE

## 2024-03-28 PROCEDURE — 3075F SYST BP GE 130 - 139MM HG: CPT | Performed by: INTERNAL MEDICINE

## 2024-03-28 PROCEDURE — G8484 FLU IMMUNIZE NO ADMIN: HCPCS | Performed by: INTERNAL MEDICINE

## 2024-03-28 PROCEDURE — G0439 PPPS, SUBSEQ VISIT: HCPCS | Performed by: INTERNAL MEDICINE

## 2024-03-28 PROCEDURE — 99213 OFFICE O/P EST LOW 20 MIN: CPT | Performed by: INTERNAL MEDICINE

## 2024-03-28 PROCEDURE — 3079F DIAST BP 80-89 MM HG: CPT | Performed by: INTERNAL MEDICINE

## 2024-03-28 PROCEDURE — 1123F ACP DISCUSS/DSCN MKR DOCD: CPT | Performed by: INTERNAL MEDICINE

## 2024-03-28 PROCEDURE — G8427 DOCREV CUR MEDS BY ELIG CLIN: HCPCS | Performed by: INTERNAL MEDICINE

## 2024-03-28 SDOH — ECONOMIC STABILITY: INCOME INSECURITY: HOW HARD IS IT FOR YOU TO PAY FOR THE VERY BASICS LIKE FOOD, HOUSING, MEDICAL CARE, AND HEATING?: NOT HARD AT ALL

## 2024-03-28 SDOH — ECONOMIC STABILITY: FOOD INSECURITY: WITHIN THE PAST 12 MONTHS, THE FOOD YOU BOUGHT JUST DIDN'T LAST AND YOU DIDN'T HAVE MONEY TO GET MORE.: NEVER TRUE

## 2024-03-28 SDOH — ECONOMIC STABILITY: FOOD INSECURITY: WITHIN THE PAST 12 MONTHS, YOU WORRIED THAT YOUR FOOD WOULD RUN OUT BEFORE YOU GOT MONEY TO BUY MORE.: NEVER TRUE

## 2024-03-28 ASSESSMENT — PATIENT HEALTH QUESTIONNAIRE - PHQ9
SUM OF ALL RESPONSES TO PHQ9 QUESTIONS 1 & 2: 0
SUM OF ALL RESPONSES TO PHQ QUESTIONS 1-9: 0
2. FEELING DOWN, DEPRESSED OR HOPELESS: NOT AT ALL
1. LITTLE INTEREST OR PLEASURE IN DOING THINGS: NOT AT ALL
SUM OF ALL RESPONSES TO PHQ QUESTIONS 1-9: 0

## 2024-03-28 NOTE — PROGRESS NOTES
Chief Complaint   Patient presents with    Medicare AWV       \"Have you been to the ER, urgent care clinic since your last visit?  Hospitalized since your last visit?\"    NO    “Have you seen or consulted any other health care providers outside of [facility] since your last visit?”    NO            Click Here for Release of Records Request

## 2024-03-28 NOTE — PATIENT INSTRUCTIONS
medicines. These can increase your chances of quitting for good. Quitting is one of the most important things you can do to protect your heart. It is never too late to quit. Try to avoid secondhand smoke too.     Stay at a weight that's healthy for you. Talk to your doctor if you need help losing weight.     Try to get 7 to 9 hours of sleep each night.     Limit alcohol to 2 drinks a day for men and 1 drink a day for women. Too much alcohol can cause health problems.     Manage other health problems such as diabetes, high blood pressure, and high cholesterol. If you think you may have a problem with alcohol or drug use, talk to your doctor.   Medicines    Take your medicines exactly as prescribed. Call your doctor if you think you are having a problem with your medicine.     If your doctor recommends aspirin, take the amount directed each day. Make sure you take aspirin and not another kind of pain reliever, such as acetaminophen (Tylenol).   When should you call for help?   Call 911 if you have symptoms of a heart attack. These may include:    Chest pain or pressure, or a strange feeling in the chest.     Sweating.     Shortness of breath.     Pain, pressure, or a strange feeling in the back, neck, jaw, or upper belly or in one or both shoulders or arms.     Lightheadedness or sudden weakness.     A fast or irregular heartbeat.   After you call 911, the  may tell you to chew 1 adult-strength or 2 to 4 low-dose aspirin. Wait for an ambulance. Do not try to drive yourself.  Watch closely for changes in your health, and be sure to contact your doctor if you have any problems.  Where can you learn more?  Go to https://www.[employer].net/patientEd and enter F075 to learn more about \"A Healthy Heart: Care Instructions.\"  Current as of: June 24, 2023               Content Version: 14.0  © 1555-4709 [employer].   Care instructions adapted under license by [employer]. If you have questions about a

## 2024-07-01 RX ORDER — LOSARTAN POTASSIUM 100 MG/1
TABLET ORAL
Qty: 90 TABLET | Refills: 2 | Status: SHIPPED | OUTPATIENT
Start: 2024-07-01

## 2024-09-29 NOTE — PROGRESS NOTES
HISTORY OF PRESENT ILLNESS   Steven Beltre   is a 83 y.o.  male.  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     No Known Allergies  Social History     Tobacco Use    Smoking status: Never    Smokeless tobacco: Never   Substance Use Topics    Alcohol use: Yes     Alcohol/week: 4.0 standard drinks of alcohol        BMP:   Lab Results   Component Value Date/Time     09/28/2023 02:15 PM    K 4.3 09/28/2023 02:15 PM     09/28/2023 02:15 PM    CO2 28 09/28/2023 02:15 PM    BUN 19 09/28/2023 02:15 PM    CREATININE 0.86 09/28/2023 02:15 PM    GLUCOSE 96 09/28/2023 02:15 PM    CALCIUM 9.0 09/28/2023 02:15 PM      CBC:   Lab Results   Component Value Date/Time    WBC 5.8 09/28/2023 02:15 PM    RBC 4.52 09/28/2023 02:15 PM    HGB 13.6 09/28/2023 02:15 PM    HCT 42.5 09/28/2023 02:15 PM    MCV 94.0 09/28/2023 02:15 PM    MCH 30.1 09/28/2023 02:15 PM    MCHC 32.0 09/28/2023 02:15 PM    RDW 14.5 09/28/2023 02:15 PM     09/28/2023 02:15 PM    MPV 12.2 09/28/2023 02:15 PM      Lipids   Lab Results   Component Value Date/Time    CHOL 170 09/28/2023

## 2024-10-01 ENCOUNTER — OFFICE VISIT (OUTPATIENT)
Age: 84
End: 2024-10-01
Payer: MEDICARE

## 2024-10-01 VITALS
SYSTOLIC BLOOD PRESSURE: 148 MMHG | DIASTOLIC BLOOD PRESSURE: 82 MMHG | HEIGHT: 73 IN | WEIGHT: 233.4 LBS | RESPIRATION RATE: 18 BRPM | BODY MASS INDEX: 30.93 KG/M2 | OXYGEN SATURATION: 96 % | TEMPERATURE: 97.2 F | HEART RATE: 68 BPM

## 2024-10-01 DIAGNOSIS — R73.03 PREDIABETES: ICD-10-CM

## 2024-10-01 DIAGNOSIS — I10 HYPERTENSION, UNSPECIFIED TYPE: Primary | ICD-10-CM

## 2024-10-01 DIAGNOSIS — I10 HYPERTENSION, UNSPECIFIED TYPE: ICD-10-CM

## 2024-10-01 DIAGNOSIS — Z85.46 HISTORY OF PROSTATE CANCER: ICD-10-CM

## 2024-10-01 PROCEDURE — 99214 OFFICE O/P EST MOD 30 MIN: CPT | Performed by: INTERNAL MEDICINE

## 2024-10-01 PROCEDURE — G8417 CALC BMI ABV UP PARAM F/U: HCPCS | Performed by: INTERNAL MEDICINE

## 2024-10-01 PROCEDURE — G8484 FLU IMMUNIZE NO ADMIN: HCPCS | Performed by: INTERNAL MEDICINE

## 2024-10-01 PROCEDURE — 1036F TOBACCO NON-USER: CPT | Performed by: INTERNAL MEDICINE

## 2024-10-01 PROCEDURE — G8427 DOCREV CUR MEDS BY ELIG CLIN: HCPCS | Performed by: INTERNAL MEDICINE

## 2024-10-01 PROCEDURE — 3079F DIAST BP 80-89 MM HG: CPT | Performed by: INTERNAL MEDICINE

## 2024-10-01 PROCEDURE — 1123F ACP DISCUSS/DSCN MKR DOCD: CPT | Performed by: INTERNAL MEDICINE

## 2024-10-01 PROCEDURE — 3077F SYST BP >= 140 MM HG: CPT | Performed by: INTERNAL MEDICINE

## 2024-10-01 NOTE — PROGRESS NOTES
\"Have you been to the ER, urgent care clinic since your last visit?  Hospitalized since your last visit?\"    NO    “Have you seen or consulted any other health care providers outside our system since your last visit?”    NO

## 2024-10-02 LAB
ALBUMIN SERPL-MCNC: 3.6 G/DL (ref 3.5–5)
ALBUMIN/GLOB SERPL: 1 (ref 1.1–2.2)
ALP SERPL-CCNC: 73 U/L (ref 45–117)
ALT SERPL-CCNC: 21 U/L (ref 12–78)
ANION GAP SERPL CALC-SCNC: 3 MMOL/L (ref 2–12)
AST SERPL-CCNC: 16 U/L (ref 15–37)
BASOPHILS # BLD: 0 K/UL (ref 0–0.1)
BASOPHILS NFR BLD: 1 % (ref 0–1)
BILIRUB SERPL-MCNC: 1.1 MG/DL (ref 0.2–1)
BUN SERPL-MCNC: 11 MG/DL (ref 6–20)
BUN/CREAT SERPL: 14 (ref 12–20)
CALCIUM SERPL-MCNC: 8.8 MG/DL (ref 8.5–10.1)
CHLORIDE SERPL-SCNC: 106 MMOL/L (ref 97–108)
CHOLEST SERPL-MCNC: 165 MG/DL
CO2 SERPL-SCNC: 31 MMOL/L (ref 21–32)
CREAT SERPL-MCNC: 0.79 MG/DL (ref 0.7–1.3)
DIFFERENTIAL METHOD BLD: ABNORMAL
EOSINOPHIL # BLD: 0.1 K/UL (ref 0–0.4)
EOSINOPHIL NFR BLD: 1 % (ref 0–7)
ERYTHROCYTE [DISTWIDTH] IN BLOOD BY AUTOMATED COUNT: 15.4 % (ref 11.5–14.5)
EST. AVERAGE GLUCOSE BLD GHB EST-MCNC: 123 MG/DL
GLOBULIN SER CALC-MCNC: 3.5 G/DL (ref 2–4)
GLUCOSE SERPL-MCNC: 105 MG/DL (ref 65–100)
HBA1C MFR BLD: 5.9 % (ref 4–5.6)
HCT VFR BLD AUTO: 40.6 % (ref 36.6–50.3)
HDLC SERPL-MCNC: 82 MG/DL
HDLC SERPL: 2 (ref 0–5)
HGB BLD-MCNC: 13.2 G/DL (ref 12.1–17)
IMM GRANULOCYTES # BLD AUTO: 0 K/UL (ref 0–0.04)
IMM GRANULOCYTES NFR BLD AUTO: 0 % (ref 0–0.5)
LDLC SERPL CALC-MCNC: 73.4 MG/DL (ref 0–100)
LYMPHOCYTES # BLD: 2.2 K/UL (ref 0.8–3.5)
LYMPHOCYTES NFR BLD: 43 % (ref 12–49)
MCH RBC QN AUTO: 29.9 PG (ref 26–34)
MCHC RBC AUTO-ENTMCNC: 32.5 G/DL (ref 30–36.5)
MCV RBC AUTO: 91.9 FL (ref 80–99)
MONOCYTES # BLD: 0.5 K/UL (ref 0–1)
MONOCYTES NFR BLD: 10 % (ref 5–13)
NEUTS SEG # BLD: 2.3 K/UL (ref 1.8–8)
NEUTS SEG NFR BLD: 45 % (ref 32–75)
NRBC # BLD: 0 K/UL (ref 0–0.01)
NRBC BLD-RTO: 0 PER 100 WBC
PLATELET # BLD AUTO: 189 K/UL (ref 150–400)
PMV BLD AUTO: 12 FL (ref 8.9–12.9)
POTASSIUM SERPL-SCNC: 3.6 MMOL/L (ref 3.5–5.1)
PROT SERPL-MCNC: 7.1 G/DL (ref 6.4–8.2)
PSA SERPL-MCNC: 0 NG/ML (ref 0.01–4)
RBC # BLD AUTO: 4.42 M/UL (ref 4.1–5.7)
SODIUM SERPL-SCNC: 140 MMOL/L (ref 136–145)
TRIGL SERPL-MCNC: 48 MG/DL
TSH SERPL DL<=0.05 MIU/L-ACNC: 1.22 UIU/ML (ref 0.36–3.74)
VLDLC SERPL CALC-MCNC: 9.6 MG/DL
WBC # BLD AUTO: 5 K/UL (ref 4.1–11.1)

## 2024-12-26 RX ORDER — AMLODIPINE BESYLATE 5 MG/1
5 TABLET ORAL DAILY
Qty: 90 TABLET | Refills: 4 | Status: SHIPPED | OUTPATIENT
Start: 2024-12-26

## 2024-12-30 RX ORDER — LOSARTAN POTASSIUM 100 MG/1
TABLET ORAL
Qty: 90 TABLET | Refills: 2 | Status: SHIPPED | OUTPATIENT
Start: 2024-12-30

## 2025-04-21 NOTE — PROGRESS NOTES
HISTORY OF PRESENT ILLNESS   Steven Beltre   is a 84 y.o.  male.  FU  HTN prediabetes, hx prostate cancer s/p prostatectomy, elevated BMI and AWV  Got [place] 3 weeks ago -had a great time. Weight up a few lbs  Getting ready for gardening [place]  Home BP--130-135/68-72      Last OV amlodipine increased to 5 mg 2 tabs qd and on losartan  Last OV  Home /68  Weight up 2 lbs since last OV     Plays golf once per week  One beer per week  Uses undergarments for urine incontinence  Patient Active Problem List    Diagnosis Date Noted    Advanced care planning/counseling discussion 11/04/2015    Prediabetes 10/30/2013    Essential hypertension, benign 04/29/2010    Umbilical hernia 04/29/2010    Obesity 04/29/2010     Current Outpatient Medications   Medication Sig Dispense Refill    losartan (COZAAR) 100 MG tablet TAKE 1 TABLET BY MOUTH EVERY DAY 90 tablet 2    amLODIPine (NORVASC) 5 MG tablet TAKE 1 TABLET BY MOUTH EVERY DAY 90 tablet 4     No current facility-administered medications for this visit.     No Known Allergies     BMP:   Lab Results   Component Value Date/Time     10/01/2024 11:09 AM    K 3.6 10/01/2024 11:09 AM     10/01/2024 11:09 AM    CO2 31 10/01/2024 11:09 AM    BUN 11 10/01/2024 11:09 AM    CREATININE 0.79 10/01/2024 11:09 AM    GLUCOSE 105 10/01/2024 11:09 AM    CALCIUM 8.8 10/01/2024 11:09 AM      CBC:   Lab Results   Component Value Date/Time    WBC 5.0 10/01/2024 11:09 AM    RBC 4.42 10/01/2024 11:09 AM    HGB 13.2 10/01/2024 11:09 AM    HCT 40.6 10/01/2024 11:09 AM    MCV 91.9 10/01/2024 11:09 AM    MCH 29.9 10/01/2024 11:09 AM    MCHC 32.5 10/01/2024 11:09 AM    RDW 15.4 10/01/2024 11:09 AM     10/01/2024 11:09 AM    MPV 12.0 10/01/2024 11:09 AM      Lipids   Lab Results   Component Value Date/Time    CHOL 165 10/01/2024 11:09 AM    TRIG 48 10/01/2024 11:09 AM    HDL 82 10/01/2024 11:09 AM    CHOLHDLRATIO 2.0 10/01/2024 11:09 AM     Hemoglobin A1C:

## 2025-04-22 SDOH — ECONOMIC STABILITY: TRANSPORTATION INSECURITY
IN THE PAST 12 MONTHS, HAS THE LACK OF TRANSPORTATION KEPT YOU FROM MEDICAL APPOINTMENTS OR FROM GETTING MEDICATIONS?: NO

## 2025-04-22 SDOH — ECONOMIC STABILITY: FOOD INSECURITY: WITHIN THE PAST 12 MONTHS, YOU WORRIED THAT YOUR FOOD WOULD RUN OUT BEFORE YOU GOT MONEY TO BUY MORE.: NEVER TRUE

## 2025-04-22 SDOH — HEALTH STABILITY: PHYSICAL HEALTH: ON AVERAGE, HOW MANY MINUTES DO YOU ENGAGE IN EXERCISE AT THIS LEVEL?: 40 MIN

## 2025-04-22 SDOH — ECONOMIC STABILITY: INCOME INSECURITY: IN THE LAST 12 MONTHS, WAS THERE A TIME WHEN YOU WERE NOT ABLE TO PAY THE MORTGAGE OR RENT ON TIME?: NO

## 2025-04-22 SDOH — ECONOMIC STABILITY: FOOD INSECURITY: WITHIN THE PAST 12 MONTHS, THE FOOD YOU BOUGHT JUST DIDN'T LAST AND YOU DIDN'T HAVE MONEY TO GET MORE.: NEVER TRUE

## 2025-04-22 SDOH — HEALTH STABILITY: PHYSICAL HEALTH: ON AVERAGE, HOW MANY DAYS PER WEEK DO YOU ENGAGE IN MODERATE TO STRENUOUS EXERCISE (LIKE A BRISK WALK)?: 7 DAYS

## 2025-04-22 ASSESSMENT — LIFESTYLE VARIABLES
HOW OFTEN DURING THE LAST YEAR HAVE YOU FAILED TO DO WHAT WAS NORMALLY EXPECTED FROM YOU BECAUSE OF DRINKING: NEVER
HOW OFTEN DURING THE LAST YEAR HAVE YOU HAD A FEELING OF GUILT OR REMORSE AFTER DRINKING: NEVER
HOW MANY STANDARD DRINKS CONTAINING ALCOHOL DO YOU HAVE ON A TYPICAL DAY: 1 OR 2
HOW OFTEN DO YOU HAVE A DRINK CONTAINING ALCOHOL: 2-3 TIMES A WEEK
HOW MANY STANDARD DRINKS CONTAINING ALCOHOL DO YOU HAVE ON A TYPICAL DAY: 1
HOW OFTEN DURING THE LAST YEAR HAVE YOU NEEDED AN ALCOHOLIC DRINK FIRST THING IN THE MORNING TO GET YOURSELF GOING AFTER A NIGHT OF HEAVY DRINKING: NEVER
HOW OFTEN DO YOU HAVE A DRINK CONTAINING ALCOHOL: 4
HOW OFTEN DURING THE LAST YEAR HAVE YOU HAD A FEELING OF GUILT OR REMORSE AFTER DRINKING: NEVER
HOW OFTEN DURING THE LAST YEAR HAVE YOU FOUND THAT YOU WERE NOT ABLE TO STOP DRINKING ONCE YOU HAD STARTED: NEVER
HAVE YOU OR SOMEONE ELSE BEEN INJURED AS A RESULT OF YOUR DRINKING: NO
HOW OFTEN DURING THE LAST YEAR HAVE YOU FOUND THAT YOU WERE NOT ABLE TO STOP DRINKING ONCE YOU HAD STARTED: NEVER
HOW OFTEN DURING THE LAST YEAR HAVE YOU NEEDED AN ALCOHOLIC DRINK FIRST THING IN THE MORNING TO GET YOURSELF GOING AFTER A NIGHT OF HEAVY DRINKING: NEVER
HAS A RELATIVE, FRIEND, DOCTOR, OR ANOTHER HEALTH PROFESSIONAL EXPRESSED CONCERN ABOUT YOUR DRINKING OR SUGGESTED YOU CUT DOWN: NO
HAVE YOU OR SOMEONE ELSE BEEN INJURED AS A RESULT OF YOUR DRINKING: NO
HOW OFTEN DURING THE LAST YEAR HAVE YOU FAILED TO DO WHAT WAS NORMALLY EXPECTED FROM YOU BECAUSE OF DRINKING: NEVER
HOW OFTEN DO YOU HAVE SIX OR MORE DRINKS ON ONE OCCASION: 2
HOW OFTEN DURING THE LAST YEAR HAVE YOU BEEN UNABLE TO REMEMBER WHAT HAPPENED THE NIGHT BEFORE BECAUSE YOU HAD BEEN DRINKING: NEVER
HAS A RELATIVE, FRIEND, DOCTOR, OR ANOTHER HEALTH PROFESSIONAL EXPRESSED CONCERN ABOUT YOUR DRINKING OR SUGGESTED YOU CUT DOWN: NO

## 2025-04-22 ASSESSMENT — PATIENT HEALTH QUESTIONNAIRE - PHQ9
SUM OF ALL RESPONSES TO PHQ QUESTIONS 1-9: 0
2. FEELING DOWN, DEPRESSED OR HOPELESS: NOT AT ALL
SUM OF ALL RESPONSES TO PHQ QUESTIONS 1-9: 0
1. LITTLE INTEREST OR PLEASURE IN DOING THINGS: NOT AT ALL

## 2025-04-23 ENCOUNTER — OFFICE VISIT (OUTPATIENT)
Age: 85
End: 2025-04-23
Payer: MEDICARE

## 2025-04-23 VITALS
WEIGHT: 233.8 LBS | TEMPERATURE: 97.3 F | RESPIRATION RATE: 16 BRPM | HEIGHT: 73 IN | SYSTOLIC BLOOD PRESSURE: 148 MMHG | HEART RATE: 70 BPM | OXYGEN SATURATION: 97 % | DIASTOLIC BLOOD PRESSURE: 72 MMHG | BODY MASS INDEX: 30.99 KG/M2

## 2025-04-23 DIAGNOSIS — I10 HYPERTENSION, UNSPECIFIED TYPE: Primary | ICD-10-CM

## 2025-04-23 DIAGNOSIS — E66.811 OBESITY (BMI 30.0-34.9): ICD-10-CM

## 2025-04-23 DIAGNOSIS — R73.03 PREDIABETES: ICD-10-CM

## 2025-04-23 DIAGNOSIS — Z85.46 HISTORY OF PROSTATE CANCER: ICD-10-CM

## 2025-04-23 LAB — HBA1C MFR BLD: 5.9 %

## 2025-04-23 PROCEDURE — G8427 DOCREV CUR MEDS BY ELIG CLIN: HCPCS | Performed by: INTERNAL MEDICINE

## 2025-04-23 PROCEDURE — 1159F MED LIST DOCD IN RCRD: CPT | Performed by: INTERNAL MEDICINE

## 2025-04-23 PROCEDURE — G8417 CALC BMI ABV UP PARAM F/U: HCPCS | Performed by: INTERNAL MEDICINE

## 2025-04-23 PROCEDURE — 83036 HEMOGLOBIN GLYCOSYLATED A1C: CPT | Performed by: INTERNAL MEDICINE

## 2025-04-23 PROCEDURE — 3077F SYST BP >= 140 MM HG: CPT | Performed by: INTERNAL MEDICINE

## 2025-04-23 PROCEDURE — PBSHW AMB POC HEMOGLOBIN A1C: Performed by: INTERNAL MEDICINE

## 2025-04-23 PROCEDURE — 99214 OFFICE O/P EST MOD 30 MIN: CPT | Performed by: INTERNAL MEDICINE

## 2025-04-23 PROCEDURE — 1123F ACP DISCUSS/DSCN MKR DOCD: CPT | Performed by: INTERNAL MEDICINE

## 2025-04-23 PROCEDURE — 1036F TOBACCO NON-USER: CPT | Performed by: INTERNAL MEDICINE

## 2025-04-23 PROCEDURE — 3078F DIAST BP <80 MM HG: CPT | Performed by: INTERNAL MEDICINE

## 2025-04-23 RX ORDER — METOPROLOL SUCCINATE 25 MG/1
25 TABLET, EXTENDED RELEASE ORAL DAILY
Qty: 30 TABLET | Refills: 3 | Status: SHIPPED | OUTPATIENT
Start: 2025-04-23 | End: 2025-04-23 | Stop reason: SDUPTHER

## 2025-04-23 RX ORDER — METOPROLOL SUCCINATE 25 MG/1
25 TABLET, EXTENDED RELEASE ORAL DAILY
Qty: 90 TABLET | Refills: 0 | Status: SHIPPED | OUTPATIENT
Start: 2025-04-23

## 2025-04-23 RX ORDER — AMLODIPINE BESYLATE 10 MG/1
10 TABLET ORAL DAILY
Qty: 90 TABLET | Refills: 3 | Status: SHIPPED | OUTPATIENT
Start: 2025-04-23

## 2025-04-23 SDOH — ECONOMIC STABILITY: FOOD INSECURITY: WITHIN THE PAST 12 MONTHS, YOU WORRIED THAT YOUR FOOD WOULD RUN OUT BEFORE YOU GOT MONEY TO BUY MORE.: NEVER TRUE

## 2025-04-23 SDOH — ECONOMIC STABILITY: FOOD INSECURITY: WITHIN THE PAST 12 MONTHS, THE FOOD YOU BOUGHT JUST DIDN'T LAST AND YOU DIDN'T HAVE MONEY TO GET MORE.: NEVER TRUE

## 2025-04-23 NOTE — PROGRESS NOTES
\"Have you been to the ER, urgent care clinic since your last visit?  Hospitalized since your last visit?\"    no    “Have you seen or consulted any other health care providers outside our system since your last visit?”    Lea - derm

## 2025-06-22 NOTE — PROGRESS NOTES
HISTORY OF PRESENT ILLNESS   Steven Beltre   is a 84 y.o.  male.  FU  HTN prediabetes, hx prostate cancer s/p prostatectomy, elevated BMI .  1 month fu HTN  Toprol xl 25 mg qd added last OV for SBP elevation-tolerating medication except sleepiness by mid day. Takes a nap and feels ok  He reports the leg edema has improved  Home /78    Last OV  Got [place] 3 weeks ago -had a great time. Weight up a few lbs  Getting ready for gardening [employer]  Home BP--130-135/68-72        Last OV amlodipine increased to 5 mg 2 tabs qd and on losartan  Patient Active Problem List    Diagnosis Date Noted    Advanced care planning/counseling discussion 11/04/2015    Prediabetes 10/30/2013    Essential hypertension, benign 04/29/2010    Umbilical hernia 04/29/2010    Obesity 04/29/2010     Current Outpatient Medications   Medication Sig Dispense Refill    amLODIPine (NORVASC) 10 MG tablet Take 1 tablet by mouth daily 90 tablet 3    metoprolol succinate (TOPROL XL) 25 MG extended release tablet Take 1 tablet by mouth daily 90 tablet 0    losartan (COZAAR) 100 MG tablet TAKE 1 TABLET BY MOUTH EVERY DAY 90 tablet 2     No current facility-administered medications for this visit.     No Known Allergies     BMP:   Lab Results   Component Value Date/Time     10/01/2024 11:09 AM    K 3.6 10/01/2024 11:09 AM     10/01/2024 11:09 AM    CO2 31 10/01/2024 11:09 AM    BUN 11 10/01/2024 11:09 AM    CREATININE 0.79 10/01/2024 11:09 AM    GLUCOSE 105 10/01/2024 11:09 AM    CALCIUM 8.8 10/01/2024 11:09 AM      CBC:   Lab Results   Component Value Date/Time    WBC 5.0 10/01/2024 11:09 AM    RBC 4.42 10/01/2024 11:09 AM    HGB 13.2 10/01/2024 11:09 AM    HCT 40.6 10/01/2024 11:09 AM    MCV 91.9 10/01/2024 11:09 AM    MCH 29.9 10/01/2024 11:09 AM    MCHC 32.5 10/01/2024 11:09 AM    RDW 15.4 10/01/2024 11:09 AM     10/01/2024 11:09 AM    MPV 12.0 10/01/2024 11:09 AM      Lipids   Lab Results   Component Value

## 2025-06-24 ENCOUNTER — OFFICE VISIT (OUTPATIENT)
Age: 85
End: 2025-06-24
Payer: MEDICARE

## 2025-06-24 VITALS
DIASTOLIC BLOOD PRESSURE: 70 MMHG | TEMPERATURE: 97.7 F | WEIGHT: 230.38 LBS | HEIGHT: 73 IN | OXYGEN SATURATION: 99 % | BODY MASS INDEX: 30.53 KG/M2 | HEART RATE: 72 BPM | SYSTOLIC BLOOD PRESSURE: 130 MMHG

## 2025-06-24 DIAGNOSIS — I10 HYPERTENSION, UNSPECIFIED TYPE: Primary | ICD-10-CM

## 2025-06-24 PROCEDURE — 1036F TOBACCO NON-USER: CPT | Performed by: INTERNAL MEDICINE

## 2025-06-24 PROCEDURE — 1123F ACP DISCUSS/DSCN MKR DOCD: CPT | Performed by: INTERNAL MEDICINE

## 2025-06-24 PROCEDURE — 3078F DIAST BP <80 MM HG: CPT | Performed by: INTERNAL MEDICINE

## 2025-06-24 PROCEDURE — 1159F MED LIST DOCD IN RCRD: CPT | Performed by: INTERNAL MEDICINE

## 2025-06-24 PROCEDURE — G8427 DOCREV CUR MEDS BY ELIG CLIN: HCPCS | Performed by: INTERNAL MEDICINE

## 2025-06-24 PROCEDURE — 99213 OFFICE O/P EST LOW 20 MIN: CPT | Performed by: INTERNAL MEDICINE

## 2025-06-24 PROCEDURE — G8417 CALC BMI ABV UP PARAM F/U: HCPCS | Performed by: INTERNAL MEDICINE

## 2025-06-24 PROCEDURE — 3075F SYST BP GE 130 - 139MM HG: CPT | Performed by: INTERNAL MEDICINE

## 2025-07-18 RX ORDER — METOPROLOL SUCCINATE 25 MG/1
25 TABLET, EXTENDED RELEASE ORAL DAILY
Qty: 90 TABLET | Refills: 1 | Status: SHIPPED | OUTPATIENT
Start: 2025-07-18

## (undated) DEVICE — (D)PREP SKN CHLRAPRP APPL 26ML -- CONVERT TO ITEM 371833

## (undated) DEVICE — DERMABOND SKIN ADH 0.7ML -- DERMABOND ADVANCED 12/BX

## (undated) DEVICE — SUTURE VCRL SZ 4-0 L27IN ABSRB UD L19MM PS-2 3/8 CIR PRIM J426H

## (undated) DEVICE — GAUZE SPONGES,12 PLY: Brand: CURITY

## (undated) DEVICE — 3M™ TEGADERM™ TRANSPARENT FILM DRESSING FRAME STYLE, 1626, 4 IN X 4-3/4 IN (10 CM X 12 CM), 50/CT 4CT/CASE: Brand: 3M™ TEGADERM™

## (undated) DEVICE — SUTURE VCRL SZ 3-0 L27IN ABSRB UD L26MM SH 1/2 CIR J416H

## (undated) DEVICE — NEEDLE HYPO 25GA L1.5IN BVL ORIENTED ECLIPSE

## (undated) DEVICE — SUTURE VCRL SZ 2-0 L27IN ABSRB VLT L26MM UR-6 5/8 CIR J602H

## (undated) DEVICE — TOWEL SURG W17XL27IN STD BLU COT NONFENESTRATED PREWASHED

## (undated) DEVICE — SURGICAL PROCEDURE PACK BASIN MAJ SET CUST NO CAUT

## (undated) DEVICE — SUT ETHBND 0 18IN MO6 MP GRN --

## (undated) DEVICE — GOWN,PREVENTION PLUS,XL,ST,24/CS: Brand: MEDLINE

## (undated) DEVICE — SOLUTION IV 1000ML 0.9% SOD CHL

## (undated) DEVICE — DBD-PACK,LAPAROTOMY,2 REINFORCED GOWNS: Brand: MEDLINE

## (undated) DEVICE — INFECTION CONTROL KIT SYS

## (undated) DEVICE — DEVON™ KNEE AND BODY STRAP 60" X 3" (1.5 M X 7.6 CM): Brand: DEVON

## (undated) DEVICE — HANDLE LT SNAP ON ULT DURABLE LENS FOR TRUMPF ALC DISPOSABLE

## (undated) DEVICE — 1200 GUARD II KIT W/5MM TUBE W/O VAC TUBE: Brand: GUARDIAN

## (undated) DEVICE — ROCKER SWITCH PENCIL BLADE ELECTRODE, HOLSTER: Brand: EDGE

## (undated) DEVICE — STERILE POLYISOPRENE POWDER-FREE SURGICAL GLOVES: Brand: PROTEXIS

## (undated) DEVICE — BINDER ABD M/L H12IN FOR 46-62IN WHT 4 SLD PNL DSGN HOOP

## (undated) DEVICE — REM POLYHESIVE ADULT PATIENT RETURN ELECTRODE: Brand: VALLEYLAB

## (undated) DEVICE — (D)SYR 10ML 1/5ML GRAD NSAF -- PKGING CHANGE USE ITEM 338027

## (undated) DEVICE — KENDALL SCD EXPRESS SLEEVES, KNEE LENGTH, MEDIUM: Brand: KENDALL SCD